# Patient Record
Sex: MALE | Race: WHITE | Employment: FULL TIME | URBAN - METROPOLITAN AREA
[De-identification: names, ages, dates, MRNs, and addresses within clinical notes are randomized per-mention and may not be internally consistent; named-entity substitution may affect disease eponyms.]

---

## 2017-05-25 ENCOUNTER — OFFICE VISIT (OUTPATIENT)
Dept: INTERNAL MEDICINE CLINIC | Age: 42
End: 2017-05-25

## 2017-05-25 VITALS
DIASTOLIC BLOOD PRESSURE: 100 MMHG | SYSTOLIC BLOOD PRESSURE: 160 MMHG | HEIGHT: 71 IN | WEIGHT: 295 LBS | BODY MASS INDEX: 41.3 KG/M2 | HEART RATE: 80 BPM

## 2017-05-25 DIAGNOSIS — N40.1 BENIGN PROSTATIC HYPERPLASIA WITH LOWER URINARY TRACT SYMPTOMS, UNSPECIFIED MORPHOLOGY: ICD-10-CM

## 2017-05-25 DIAGNOSIS — E66.09 NON MORBID OBESITY DUE TO EXCESS CALORIES: ICD-10-CM

## 2017-05-25 DIAGNOSIS — R39.15 URINARY URGENCY: ICD-10-CM

## 2017-05-25 DIAGNOSIS — R35.0 URINARY FREQUENCY: Primary | ICD-10-CM

## 2017-05-25 DIAGNOSIS — Z12.5 SCREENING FOR PROSTATE CANCER: ICD-10-CM

## 2017-05-25 PROCEDURE — 99203 OFFICE O/P NEW LOW 30 MIN: CPT | Performed by: INTERNAL MEDICINE

## 2017-05-25 RX ORDER — TAMSULOSIN HYDROCHLORIDE 0.4 MG/1
0.4 CAPSULE ORAL DAILY
Qty: 30 CAPSULE | Refills: 2 | Status: SHIPPED | OUTPATIENT
Start: 2017-05-25 | End: 2018-11-19

## 2017-05-25 RX ORDER — LISINOPRIL 5 MG/1
5 TABLET ORAL DAILY
Qty: 30 TABLET | Refills: 3 | Status: SHIPPED | OUTPATIENT
Start: 2017-05-25

## 2017-05-25 ASSESSMENT — ENCOUNTER SYMPTOMS
COUGH: 0
EYE DISCHARGE: 0
VOMITING: 0
EYE PAIN: 0
RHINORRHEA: 0
NAUSEA: 0
ABDOMINAL PAIN: 0
WHEEZING: 0
SHORTNESS OF BREATH: 0
COLOR CHANGE: 0
VOICE CHANGE: 0

## 2017-05-25 ASSESSMENT — PATIENT HEALTH QUESTIONNAIRE - PHQ9
2. FEELING DOWN, DEPRESSED OR HOPELESS: 0
1. LITTLE INTEREST OR PLEASURE IN DOING THINGS: 0
SUM OF ALL RESPONSES TO PHQ9 QUESTIONS 1 & 2: 0
SUM OF ALL RESPONSES TO PHQ QUESTIONS 1-9: 0

## 2017-07-29 PROBLEM — R03.0 ELEVATED BLOOD PRESSURE READING: Status: ACTIVE | Noted: 2017-05-25

## 2018-04-17 ENCOUNTER — OFFICE VISIT (OUTPATIENT)
Dept: INTERNAL MEDICINE CLINIC | Age: 43
End: 2018-04-17

## 2018-04-17 VITALS
HEIGHT: 71 IN | DIASTOLIC BLOOD PRESSURE: 92 MMHG | OXYGEN SATURATION: 97 % | HEART RATE: 90 BPM | BODY MASS INDEX: 41.75 KG/M2 | TEMPERATURE: 98.1 F | WEIGHT: 298.2 LBS | SYSTOLIC BLOOD PRESSURE: 130 MMHG

## 2018-04-17 DIAGNOSIS — J00 OTHER ACUTE RHINITIS: ICD-10-CM

## 2018-04-17 DIAGNOSIS — R50.9 FEVER, UNSPECIFIED FEVER CAUSE: ICD-10-CM

## 2018-04-17 DIAGNOSIS — R68.89 FLU-LIKE SYMPTOMS: Primary | ICD-10-CM

## 2018-04-17 LAB
INFLUENZA A ANTIBODY: NORMAL
INFLUENZA B ANTIBODY: NORMAL

## 2018-04-17 PROCEDURE — 99213 OFFICE O/P EST LOW 20 MIN: CPT | Performed by: INTERNAL MEDICINE

## 2018-04-17 PROCEDURE — 87804 INFLUENZA ASSAY W/OPTIC: CPT | Performed by: INTERNAL MEDICINE

## 2018-04-17 RX ORDER — LORATADINE 10 MG/1
10 TABLET ORAL DAILY
Qty: 30 TABLET | Refills: 0 | Status: SHIPPED | OUTPATIENT
Start: 2018-04-17 | End: 2020-05-04

## 2018-04-17 RX ORDER — FLUTICASONE PROPIONATE 50 MCG
1 SPRAY, SUSPENSION (ML) NASAL DAILY
Qty: 1 BOTTLE | Refills: 3 | Status: SHIPPED | OUTPATIENT
Start: 2018-04-17 | End: 2020-05-04

## 2018-04-17 RX ORDER — METHYLPREDNISOLONE 4 MG/1
TABLET ORAL
Qty: 1 KIT | Refills: 0 | Status: SHIPPED | OUTPATIENT
Start: 2018-04-17 | End: 2018-04-23

## 2018-04-17 ASSESSMENT — ENCOUNTER SYMPTOMS
COUGH: 1
DIARRHEA: 0
VOICE CHANGE: 0
WHEEZING: 0
TROUBLE SWALLOWING: 0
PHOTOPHOBIA: 0
VOMITING: 0
SINUS PRESSURE: 1
SHORTNESS OF BREATH: 0
SORE THROAT: 1
SINUS PAIN: 1
RHINORRHEA: 1

## 2018-04-19 ENCOUNTER — TELEPHONE (OUTPATIENT)
Dept: INTERNAL MEDICINE CLINIC | Age: 43
End: 2018-04-19

## 2018-04-19 NOTE — LETTER
[address] [facility]  [phone]         4-  Patient Name: Mark Martin ([id number])  Under the Care of: Priti Ziegler MD      Please excuse Belle Jaramillo from work on 04/20/2018. Will be able to return to work on  next scheduled work day.         Priti Ziegler MD

## 2018-05-02 ENCOUNTER — TELEPHONE (OUTPATIENT)
Dept: INTERNAL MEDICINE CLINIC | Age: 43
End: 2018-05-02

## 2018-10-17 ENCOUNTER — OFFICE VISIT (OUTPATIENT)
Dept: INTERNAL MEDICINE CLINIC | Age: 43
End: 2018-10-17
Payer: COMMERCIAL

## 2018-10-17 VITALS
WEIGHT: 281 LBS | HEIGHT: 71 IN | SYSTOLIC BLOOD PRESSURE: 148 MMHG | BODY MASS INDEX: 39.34 KG/M2 | HEART RATE: 78 BPM | DIASTOLIC BLOOD PRESSURE: 100 MMHG

## 2018-10-17 DIAGNOSIS — F32.A DEPRESSION, UNSPECIFIED DEPRESSION TYPE: Primary | ICD-10-CM

## 2018-10-17 DIAGNOSIS — R03.0 ELEVATED BLOOD PRESSURE READING: ICD-10-CM

## 2018-10-17 DIAGNOSIS — Z13.31 POSITIVE DEPRESSION SCREENING: ICD-10-CM

## 2018-10-17 DIAGNOSIS — F11.24 OPIOID DEPENDENCE WITH OPIOID-INDUCED MOOD DISORDER (HCC): ICD-10-CM

## 2018-10-17 DIAGNOSIS — F41.1 GAD (GENERALIZED ANXIETY DISORDER): ICD-10-CM

## 2018-10-17 DIAGNOSIS — F32.A DEPRESSION, UNSPECIFIED DEPRESSION TYPE: ICD-10-CM

## 2018-10-17 DIAGNOSIS — G89.29 CHRONIC MIDLINE LOW BACK PAIN, WITH SCIATICA PRESENCE UNSPECIFIED: ICD-10-CM

## 2018-10-17 DIAGNOSIS — M54.5 CHRONIC MIDLINE LOW BACK PAIN, WITH SCIATICA PRESENCE UNSPECIFIED: ICD-10-CM

## 2018-10-17 LAB
A/G RATIO: 1.8 (ref 1.1–2.2)
ALBUMIN SERPL-MCNC: 4.9 G/DL (ref 3.4–5)
ALP BLD-CCNC: 77 U/L (ref 40–129)
ALT SERPL-CCNC: 35 U/L (ref 10–40)
ANION GAP SERPL CALCULATED.3IONS-SCNC: 15 MMOL/L (ref 3–16)
AST SERPL-CCNC: 25 U/L (ref 15–37)
BACTERIA: ABNORMAL /HPF
BILIRUB SERPL-MCNC: 0.7 MG/DL (ref 0–1)
BILIRUBIN URINE: ABNORMAL
BLOOD, URINE: ABNORMAL
BUN BLDV-MCNC: 16 MG/DL (ref 7–20)
CALCIUM SERPL-MCNC: 10.6 MG/DL (ref 8.3–10.6)
CHLORIDE BLD-SCNC: 103 MMOL/L (ref 99–110)
CLARITY: ABNORMAL
CO2: 27 MMOL/L (ref 21–32)
COLOR: ABNORMAL
CREAT SERPL-MCNC: 0.8 MG/DL (ref 0.9–1.3)
CRYSTALS, UA: ABNORMAL /HPF
EPITHELIAL CELLS, UA: 1 /HPF (ref 0–5)
FOLATE: 19.07 NG/ML (ref 4.78–24.2)
GFR AFRICAN AMERICAN: >60
GFR NON-AFRICAN AMERICAN: >60
GLOBULIN: 2.8 G/DL
GLUCOSE BLD-MCNC: 107 MG/DL (ref 70–99)
GLUCOSE URINE: NEGATIVE MG/DL
HCT VFR BLD CALC: 53.1 % (ref 40.5–52.5)
HEMOGLOBIN: 17.6 G/DL (ref 13.5–17.5)
HYALINE CASTS: 3 /LPF (ref 0–8)
KETONES, URINE: ABNORMAL MG/DL
LEUKOCYTE ESTERASE, URINE: ABNORMAL
MCH RBC QN AUTO: 29.9 PG (ref 26–34)
MCHC RBC AUTO-ENTMCNC: 33.2 G/DL (ref 31–36)
MCV RBC AUTO: 90.2 FL (ref 80–100)
MICROSCOPIC EXAMINATION: YES
NITRITE, URINE: NEGATIVE
PDW BLD-RTO: 13.4 % (ref 12.4–15.4)
PH UA: 6
PLATELET # BLD: 264 K/UL (ref 135–450)
PMV BLD AUTO: 10.6 FL (ref 5–10.5)
POTASSIUM SERPL-SCNC: 3.9 MMOL/L (ref 3.5–5.1)
PROTEIN UA: 30 MG/DL
RBC # BLD: 5.89 M/UL (ref 4.2–5.9)
RBC UA: 5 /HPF (ref 0–4)
SODIUM BLD-SCNC: 145 MMOL/L (ref 136–145)
SPECIFIC GRAVITY UA: >1.03
TOTAL PROTEIN: 7.7 G/DL (ref 6.4–8.2)
TSH REFLEX: 4.08 UIU/ML (ref 0.27–4.2)
URINE TYPE: ABNORMAL
UROBILINOGEN, URINE: 0.2 E.U./DL
VITAMIN B-12: 933 PG/ML (ref 211–911)
WBC # BLD: 13.1 K/UL (ref 4–11)
WBC UA: 6 /HPF (ref 0–5)

## 2018-10-17 PROCEDURE — 99214 OFFICE O/P EST MOD 30 MIN: CPT | Performed by: INTERNAL MEDICINE

## 2018-10-17 PROCEDURE — G8431 POS CLIN DEPRES SCRN F/U DOC: HCPCS | Performed by: INTERNAL MEDICINE

## 2018-10-17 PROCEDURE — G0444 DEPRESSION SCREEN ANNUAL: HCPCS | Performed by: INTERNAL MEDICINE

## 2018-10-17 RX ORDER — CLONIDINE HYDROCHLORIDE 0.1 MG/1
0.1 TABLET ORAL NIGHTLY
Qty: 30 TABLET | Refills: 2 | Status: SHIPPED | OUTPATIENT
Start: 2018-10-17 | End: 2019-01-14 | Stop reason: SDUPTHER

## 2018-10-17 ASSESSMENT — PATIENT HEALTH QUESTIONNAIRE - PHQ9
SUM OF ALL RESPONSES TO PHQ QUESTIONS 1-9: 19
5. POOR APPETITE OR OVEREATING: 3
1. LITTLE INTEREST OR PLEASURE IN DOING THINGS: 2
10. IF YOU CHECKED OFF ANY PROBLEMS, HOW DIFFICULT HAVE THESE PROBLEMS MADE IT FOR YOU TO DO YOUR WORK, TAKE CARE OF THINGS AT HOME, OR GET ALONG WITH OTHER PEOPLE: 2
8. MOVING OR SPEAKING SO SLOWLY THAT OTHER PEOPLE COULD HAVE NOTICED. OR THE OPPOSITE, BEING SO FIGETY OR RESTLESS THAT YOU HAVE BEEN MOVING AROUND A LOT MORE THAN USUAL: 3
3. TROUBLE FALLING OR STAYING ASLEEP: 3
4. FEELING TIRED OR HAVING LITTLE ENERGY: 3
7. TROUBLE CONCENTRATING ON THINGS, SUCH AS READING THE NEWSPAPER OR WATCHING TELEVISION: 0
2. FEELING DOWN, DEPRESSED OR HOPELESS: 2
9. THOUGHTS THAT YOU WOULD BE BETTER OFF DEAD, OR OF HURTING YOURSELF: 0
6. FEELING BAD ABOUT YOURSELF - OR THAT YOU ARE A FAILURE OR HAVE LET YOURSELF OR YOUR FAMILY DOWN: 3
SUM OF ALL RESPONSES TO PHQ9 QUESTIONS 1 & 2: 4
SUM OF ALL RESPONSES TO PHQ QUESTIONS 1-9: 19

## 2018-10-17 ASSESSMENT — ENCOUNTER SYMPTOMS
ABDOMINAL PAIN: 0
VOICE CHANGE: 0
WHEEZING: 0
VOMITING: 0
SHORTNESS OF BREATH: 0
TROUBLE SWALLOWING: 0
NAUSEA: 0

## 2018-10-17 NOTE — PROGRESS NOTES
Endocrine: Negative for polyphagia and polyuria. Genitourinary: Negative for difficulty urinating, dysuria and frequency. Musculoskeletal:        Chronic back pain   Psychiatric/Behavioral: Positive for decreased concentration and sleep disturbance. Negative for hallucinations and suicidal ideas. The patient is nervous/anxious. OBJECTIVE:  Pulse Readings from Last 4 Encounters:   10/17/18 78   04/17/18 90   05/25/17 80   10/08/15 69     Wt Readings from Last 4 Encounters:   10/17/18 281 lb (127.5 kg)   04/17/18 298 lb 3.2 oz (135.3 kg)   05/25/17 295 lb (133.8 kg)   10/08/15 295 lb 4.8 oz (133.9 kg)     BP Readings from Last 4 Encounters:   10/17/18 (!) 148/100   04/17/18 (!) 130/92   05/25/17 (!) 160/100   10/08/15 138/89     Physical Exam   Constitutional: He is oriented to person, place, and time. No distress. obese   HENT:   Head: Normocephalic and atraumatic. Eyes: Pupils are equal, round, and reactive to light. Conjunctivae and EOM are normal.   Neck: Normal range of motion. Neck supple. No thyromegaly present. Cardiovascular: Normal rate, regular rhythm and normal heart sounds. Pulmonary/Chest: Effort normal and breath sounds normal.   Abdominal: Soft. Bowel sounds are normal.   obese   Musculoskeletal:   Mild tenderness at the lower spine and paraspinal area   Neurological: He is alert and oriented to person, place, and time. He displays normal reflexes. He exhibits normal muscle tone. Coordination normal.   Skin: Skin is warm and dry. Capillary refill takes less than 2 seconds. He is not diaphoretic. Psychiatric:   PHQ 9 score is 17  LAMONT score is 18   Nursing note and vitals reviewed.       CBC:   Lab Results   Component Value Date    WBC 9.3 10/08/2015    HGB 15.3 10/08/2015    HCT 45.9 10/08/2015     10/08/2015     CMP:  Lab Results   Component Value Date     10/07/2015    K 3.6 10/07/2015     10/07/2015    CO2 25 10/07/2015    ANIONGAP 16 10/07/2015    GLUCOSE software. Every effort was made to ensure accuracy; however, inadvertent  Unintentional computerized transcription errors may be present. On the basis of positive PHQ-9 screening (PHQ-9 Total Score: 19), the following plan was implemented: medication prescribed: Zoloft- 50 mg/day- patient will call for any significant medication side effects or worsening symptoms of depression. Patient will follow-up in 1 month(s) with PCP. He is referred to  Psychiatrist and will follow-up with psychiatrist at his earliest convenience.

## 2018-10-18 DIAGNOSIS — R89.9 ABNORMAL LABORATORY TEST: Primary | ICD-10-CM

## 2018-10-18 PROBLEM — R82.998 CALCIUM OXALATE CRYSTALS IN URINE: Status: ACTIVE | Noted: 2018-10-18

## 2018-10-18 LAB
ESTIMATED AVERAGE GLUCOSE: 108.3 MG/DL
HBA1C MFR BLD: 5.4 %

## 2018-10-18 RX ORDER — CEPHALEXIN 500 MG/1
500 CAPSULE ORAL 3 TIMES DAILY
Qty: 21 CAPSULE | Refills: 0 | Status: SHIPPED | OUTPATIENT
Start: 2018-10-18 | End: 2018-11-19 | Stop reason: ALTCHOICE

## 2018-10-23 ENCOUNTER — TELEPHONE (OUTPATIENT)
Dept: INTERNAL MEDICINE CLINIC | Age: 43
End: 2018-10-23

## 2018-10-23 NOTE — TELEPHONE ENCOUNTER
Pt wife calling ---pt  has gotten 2 calls from someone named Alfredo Baron about moving pt appt up----she keeps giving are number to call but there is no message about where or why to move his appt---who is she and where would we reach her? Do you need this pt moved? If so please send me message back as to where you want him moved and I will call the wife back and take care of it. Thanks.

## 2018-11-19 ENCOUNTER — OFFICE VISIT (OUTPATIENT)
Dept: INTERNAL MEDICINE CLINIC | Age: 43
End: 2018-11-19
Payer: COMMERCIAL

## 2018-11-19 VITALS
HEART RATE: 72 BPM | BODY MASS INDEX: 39.2 KG/M2 | WEIGHT: 280 LBS | DIASTOLIC BLOOD PRESSURE: 88 MMHG | SYSTOLIC BLOOD PRESSURE: 134 MMHG | HEIGHT: 71 IN

## 2018-11-19 DIAGNOSIS — D72.829 LEUKOCYTOSIS, UNSPECIFIED TYPE: ICD-10-CM

## 2018-11-19 DIAGNOSIS — F41.1 GAD (GENERALIZED ANXIETY DISORDER): ICD-10-CM

## 2018-11-19 DIAGNOSIS — F51.01 PRIMARY INSOMNIA: ICD-10-CM

## 2018-11-19 DIAGNOSIS — I10 ESSENTIAL HYPERTENSION: ICD-10-CM

## 2018-11-19 DIAGNOSIS — F32.A DEPRESSION, UNSPECIFIED DEPRESSION TYPE: Primary | ICD-10-CM

## 2018-11-19 PROCEDURE — 99214 OFFICE O/P EST MOD 30 MIN: CPT | Performed by: INTERNAL MEDICINE

## 2018-11-19 ASSESSMENT — ENCOUNTER SYMPTOMS
NAUSEA: 0
PHOTOPHOBIA: 0
ABDOMINAL PAIN: 0
WHEEZING: 0
SHORTNESS OF BREATH: 0

## 2018-12-03 DIAGNOSIS — F32.A DEPRESSION, UNSPECIFIED DEPRESSION TYPE: ICD-10-CM

## 2019-03-06 ENCOUNTER — HOSPITAL ENCOUNTER (EMERGENCY)
Age: 44
Discharge: HOME | End: 2019-03-06
Payer: COMMERCIAL

## 2019-03-06 ENCOUNTER — APPOINTMENT (OUTPATIENT)
Dept: CT IMAGING | Age: 44
End: 2019-03-06
Payer: COMMERCIAL

## 2019-03-06 ENCOUNTER — APPOINTMENT (OUTPATIENT)
Dept: GENERAL RADIOLOGY | Age: 44
End: 2019-03-06
Payer: COMMERCIAL

## 2019-03-06 VITALS
WEIGHT: 285 LBS | SYSTOLIC BLOOD PRESSURE: 140 MMHG | RESPIRATION RATE: 16 BRPM | HEART RATE: 74 BPM | BODY MASS INDEX: 39.9 KG/M2 | DIASTOLIC BLOOD PRESSURE: 86 MMHG | OXYGEN SATURATION: 95 % | TEMPERATURE: 98.2 F | HEIGHT: 71 IN

## 2019-03-06 DIAGNOSIS — N20.1 URETEROLITHIASIS: Primary | ICD-10-CM

## 2019-03-06 DIAGNOSIS — R10.9 LEFT FLANK PAIN: ICD-10-CM

## 2019-03-06 LAB
A/G RATIO: 1.6 (ref 1.1–2.2)
ALBUMIN SERPL-MCNC: 4.4 G/DL (ref 3.4–5)
ALP BLD-CCNC: 72 U/L (ref 40–129)
ALT SERPL-CCNC: 34 U/L (ref 10–40)
ANION GAP SERPL CALCULATED.3IONS-SCNC: 14 MMOL/L (ref 3–16)
AST SERPL-CCNC: 28 U/L (ref 15–37)
BASOPHILS ABSOLUTE: 0.1 K/UL (ref 0–0.2)
BASOPHILS RELATIVE PERCENT: 0.8 %
BILIRUB SERPL-MCNC: 0.5 MG/DL (ref 0–1)
BILIRUBIN URINE: ABNORMAL
BLOOD, URINE: ABNORMAL
BUN BLDV-MCNC: 13 MG/DL (ref 7–20)
CALCIUM SERPL-MCNC: 9.3 MG/DL (ref 8.3–10.6)
CHLORIDE BLD-SCNC: 104 MMOL/L (ref 99–110)
CLARITY: ABNORMAL
CO2: 23 MMOL/L (ref 21–32)
COLOR: ABNORMAL
CREAT SERPL-MCNC: 0.7 MG/DL (ref 0.9–1.3)
EOSINOPHILS ABSOLUTE: 0 K/UL (ref 0–0.6)
EOSINOPHILS RELATIVE PERCENT: 0.6 %
EPITHELIAL CELLS, UA: 1 /HPF (ref 0–5)
GFR AFRICAN AMERICAN: >60
GFR NON-AFRICAN AMERICAN: >60
GLOBULIN: 2.7 G/DL
GLUCOSE BLD-MCNC: 99 MG/DL (ref 70–99)
GLUCOSE URINE: NEGATIVE MG/DL
HCT VFR BLD CALC: 45.8 % (ref 40.5–52.5)
HEMOGLOBIN: 15.6 G/DL (ref 13.5–17.5)
HYALINE CASTS: 1 /LPF (ref 0–8)
KETONES, URINE: ABNORMAL MG/DL
LEUKOCYTE ESTERASE, URINE: ABNORMAL
LIPASE: 38 U/L (ref 13–60)
LYMPHOCYTES ABSOLUTE: 1.3 K/UL (ref 1–5.1)
LYMPHOCYTES RELATIVE PERCENT: 17.1 %
MCH RBC QN AUTO: 30.1 PG (ref 26–34)
MCHC RBC AUTO-ENTMCNC: 34 G/DL (ref 31–36)
MCV RBC AUTO: 88.6 FL (ref 80–100)
MICROSCOPIC EXAMINATION: YES
MONOCYTES ABSOLUTE: 0.9 K/UL (ref 0–1.3)
MONOCYTES RELATIVE PERCENT: 12.7 %
NEUTROPHILS ABSOLUTE: 5.1 K/UL (ref 1.7–7.7)
NEUTROPHILS RELATIVE PERCENT: 68.8 %
NITRITE, URINE: NEGATIVE
PDW BLD-RTO: 13.8 % (ref 12.4–15.4)
PH UA: 6 (ref 5–8)
PLATELET # BLD: 166 K/UL (ref 135–450)
PMV BLD AUTO: 9.5 FL (ref 5–10.5)
POTASSIUM REFLEX MAGNESIUM: 4 MMOL/L (ref 3.5–5.1)
PROTEIN UA: 30 MG/DL
RBC # BLD: 5.17 M/UL (ref 4.2–5.9)
RBC UA: >900 /HPF (ref 0–4)
SODIUM BLD-SCNC: 141 MMOL/L (ref 136–145)
SPECIFIC GRAVITY UA: 1.02 (ref 1–1.03)
TOTAL PROTEIN: 7.1 G/DL (ref 6.4–8.2)
URINE REFLEX TO CULTURE: YES
URINE TYPE: ABNORMAL
UROBILINOGEN, URINE: 1 E.U./DL
WBC # BLD: 7.4 K/UL (ref 4–11)
WBC UA: 5 /HPF (ref 0–5)

## 2019-03-06 PROCEDURE — 85025 COMPLETE CBC W/AUTO DIFF WBC: CPT

## 2019-03-06 PROCEDURE — 96375 TX/PRO/DX INJ NEW DRUG ADDON: CPT

## 2019-03-06 PROCEDURE — 87086 URINE CULTURE/COLONY COUNT: CPT

## 2019-03-06 PROCEDURE — 74176 CT ABD & PELVIS W/O CONTRAST: CPT

## 2019-03-06 PROCEDURE — 96361 HYDRATE IV INFUSION ADD-ON: CPT

## 2019-03-06 PROCEDURE — 6360000002 HC RX W HCPCS: Performed by: PHYSICIAN ASSISTANT

## 2019-03-06 PROCEDURE — 6370000000 HC RX 637 (ALT 250 FOR IP): Performed by: PHYSICIAN ASSISTANT

## 2019-03-06 PROCEDURE — 81001 URINALYSIS AUTO W/SCOPE: CPT

## 2019-03-06 PROCEDURE — 2580000003 HC RX 258: Performed by: PHYSICIAN ASSISTANT

## 2019-03-06 PROCEDURE — 96376 TX/PRO/DX INJ SAME DRUG ADON: CPT

## 2019-03-06 PROCEDURE — 71046 X-RAY EXAM CHEST 2 VIEWS: CPT

## 2019-03-06 PROCEDURE — 83690 ASSAY OF LIPASE: CPT

## 2019-03-06 PROCEDURE — 96374 THER/PROPH/DIAG INJ IV PUSH: CPT

## 2019-03-06 PROCEDURE — 80053 COMPREHEN METABOLIC PANEL: CPT

## 2019-03-06 PROCEDURE — 99284 EMERGENCY DEPT VISIT MOD MDM: CPT

## 2019-03-06 RX ORDER — TAMSULOSIN HYDROCHLORIDE 0.4 MG/1
0.4 CAPSULE ORAL DAILY
Qty: 5 CAPSULE | Refills: 0 | Status: SHIPPED | OUTPATIENT
Start: 2019-03-06 | End: 2020-05-04

## 2019-03-06 RX ORDER — HYDROCODONE BITARTRATE AND ACETAMINOPHEN 5; 325 MG/1; MG/1
1 TABLET ORAL EVERY 6 HOURS PRN
Qty: 20 TABLET | Refills: 0 | Status: SHIPPED | OUTPATIENT
Start: 2019-03-06 | End: 2019-03-09

## 2019-03-06 RX ORDER — ONDANSETRON 4 MG/1
4 TABLET, ORALLY DISINTEGRATING ORAL EVERY 8 HOURS PRN
Qty: 20 TABLET | Refills: 0 | Status: SHIPPED | OUTPATIENT
Start: 2019-03-06 | End: 2020-05-04

## 2019-03-06 RX ORDER — MORPHINE SULFATE 4 MG/ML
4 INJECTION, SOLUTION INTRAMUSCULAR; INTRAVENOUS
Status: DISCONTINUED | OUTPATIENT
Start: 2019-03-06 | End: 2019-03-06 | Stop reason: HOSPADM

## 2019-03-06 RX ORDER — TAMSULOSIN HYDROCHLORIDE 0.4 MG/1
0.4 CAPSULE ORAL DAILY
Status: DISCONTINUED | OUTPATIENT
Start: 2019-03-06 | End: 2019-03-06 | Stop reason: HOSPADM

## 2019-03-06 RX ORDER — KETOROLAC TROMETHAMINE 30 MG/ML
30 INJECTION, SOLUTION INTRAMUSCULAR; INTRAVENOUS ONCE
Status: COMPLETED | OUTPATIENT
Start: 2019-03-06 | End: 2019-03-06

## 2019-03-06 RX ORDER — 0.9 % SODIUM CHLORIDE 0.9 %
1000 INTRAVENOUS SOLUTION INTRAVENOUS ONCE
Status: COMPLETED | OUTPATIENT
Start: 2019-03-06 | End: 2019-03-06

## 2019-03-06 RX ORDER — ONDANSETRON 2 MG/ML
4 INJECTION INTRAMUSCULAR; INTRAVENOUS
Status: COMPLETED | OUTPATIENT
Start: 2019-03-06 | End: 2019-03-06

## 2019-03-06 RX ADMIN — KETOROLAC TROMETHAMINE 30 MG: 30 INJECTION, SOLUTION INTRAMUSCULAR at 16:42

## 2019-03-06 RX ADMIN — SODIUM CHLORIDE 1000 ML: 9 INJECTION, SOLUTION INTRAVENOUS at 15:24

## 2019-03-06 RX ADMIN — MORPHINE SULFATE 4 MG: 4 INJECTION INTRAVENOUS at 15:25

## 2019-03-06 RX ADMIN — ONDANSETRON 4 MG: 2 INJECTION INTRAMUSCULAR; INTRAVENOUS at 15:25

## 2019-03-06 RX ADMIN — TAMSULOSIN HYDROCHLORIDE 0.4 MG: 0.4 CAPSULE ORAL at 17:12

## 2019-03-06 RX ADMIN — MORPHINE SULFATE 4 MG: 4 INJECTION INTRAVENOUS at 16:42

## 2019-03-06 RX ADMIN — ONDANSETRON 4 MG: 2 INJECTION INTRAMUSCULAR; INTRAVENOUS at 16:41

## 2019-03-06 ASSESSMENT — ENCOUNTER SYMPTOMS
BACK PAIN: 1
COUGH: 1
VOMITING: 0
COLOR CHANGE: 0
SHORTNESS OF BREATH: 0
ABDOMINAL PAIN: 1
DIARRHEA: 0
NAUSEA: 1

## 2019-03-06 ASSESSMENT — PAIN SCALES - GENERAL
PAINLEVEL_OUTOF10: 9

## 2019-03-06 ASSESSMENT — PAIN DESCRIPTION - PAIN TYPE: TYPE: ACUTE PAIN

## 2019-03-06 ASSESSMENT — PAIN DESCRIPTION - LOCATION: LOCATION: ABDOMEN

## 2019-03-08 LAB — URINE CULTURE, ROUTINE: NORMAL

## 2019-03-12 ENCOUNTER — HOSPITAL ENCOUNTER (EMERGENCY)
Age: 44
Discharge: HOME | End: 2019-03-13
Attending: EMERGENCY MEDICINE
Payer: COMMERCIAL

## 2019-03-12 DIAGNOSIS — N20.1 URETEROLITHIASIS: Primary | ICD-10-CM

## 2019-03-12 LAB
A/G RATIO: 1.5 (ref 1.1–2.2)
ALBUMIN SERPL-MCNC: 4.3 G/DL (ref 3.4–5)
ALP BLD-CCNC: 66 U/L (ref 40–129)
ALT SERPL-CCNC: 26 U/L (ref 10–40)
ANION GAP SERPL CALCULATED.3IONS-SCNC: 13 MMOL/L (ref 3–16)
AST SERPL-CCNC: 24 U/L (ref 15–37)
BASOPHILS ABSOLUTE: 0.1 K/UL (ref 0–0.2)
BASOPHILS RELATIVE PERCENT: 0.6 %
BILIRUB SERPL-MCNC: 0.5 MG/DL (ref 0–1)
BILIRUBIN URINE: NEGATIVE
BLOOD, URINE: ABNORMAL
BUN BLDV-MCNC: 27 MG/DL (ref 7–20)
CALCIUM SERPL-MCNC: 9.3 MG/DL (ref 8.3–10.6)
CHLORIDE BLD-SCNC: 101 MMOL/L (ref 99–110)
CLARITY: CLEAR
CO2: 26 MMOL/L (ref 21–32)
COLOR: YELLOW
CREAT SERPL-MCNC: 1.3 MG/DL (ref 0.9–1.3)
EOSINOPHILS ABSOLUTE: 0.1 K/UL (ref 0–0.6)
EOSINOPHILS RELATIVE PERCENT: 0.5 %
EPITHELIAL CELLS, UA: 1 /HPF (ref 0–5)
GFR AFRICAN AMERICAN: >60
GFR NON-AFRICAN AMERICAN: 60
GLOBULIN: 2.9 G/DL
GLUCOSE BLD-MCNC: 100 MG/DL (ref 70–99)
GLUCOSE URINE: NEGATIVE MG/DL
HCT VFR BLD CALC: 45.2 % (ref 40.5–52.5)
HEMOGLOBIN: 15.2 G/DL (ref 13.5–17.5)
HYALINE CASTS: 0 /LPF (ref 0–8)
KETONES, URINE: NEGATIVE MG/DL
LEUKOCYTE ESTERASE, URINE: NEGATIVE
LYMPHOCYTES ABSOLUTE: 2.6 K/UL (ref 1–5.1)
LYMPHOCYTES RELATIVE PERCENT: 24.4 %
MCH RBC QN AUTO: 29.6 PG (ref 26–34)
MCHC RBC AUTO-ENTMCNC: 33.7 G/DL (ref 31–36)
MCV RBC AUTO: 87.7 FL (ref 80–100)
MICROSCOPIC EXAMINATION: YES
MONOCYTES ABSOLUTE: 1.2 K/UL (ref 0–1.3)
MONOCYTES RELATIVE PERCENT: 11.2 %
NEUTROPHILS ABSOLUTE: 6.8 K/UL (ref 1.7–7.7)
NEUTROPHILS RELATIVE PERCENT: 63.3 %
NITRITE, URINE: NEGATIVE
PDW BLD-RTO: 13.1 % (ref 12.4–15.4)
PH UA: 5.5 (ref 5–8)
PLATELET # BLD: 214 K/UL (ref 135–450)
PMV BLD AUTO: 8.7 FL (ref 5–10.5)
POTASSIUM SERPL-SCNC: 4.1 MMOL/L (ref 3.5–5.1)
PROTEIN UA: NEGATIVE MG/DL
RBC # BLD: 5.16 M/UL (ref 4.2–5.9)
RBC UA: 1 /HPF (ref 0–4)
SODIUM BLD-SCNC: 140 MMOL/L (ref 136–145)
SPECIFIC GRAVITY UA: 1.03 (ref 1–1.03)
TOTAL PROTEIN: 7.2 G/DL (ref 6.4–8.2)
URINE TYPE: ABNORMAL
UROBILINOGEN, URINE: 0.2 E.U./DL
WBC # BLD: 10.7 K/UL (ref 4–11)
WBC UA: 4 /HPF (ref 0–5)

## 2019-03-12 PROCEDURE — 36415 COLL VENOUS BLD VENIPUNCTURE: CPT

## 2019-03-12 PROCEDURE — 99284 EMERGENCY DEPT VISIT MOD MDM: CPT

## 2019-03-12 PROCEDURE — 85025 COMPLETE CBC W/AUTO DIFF WBC: CPT

## 2019-03-12 PROCEDURE — 81001 URINALYSIS AUTO W/SCOPE: CPT

## 2019-03-12 PROCEDURE — 80053 COMPREHEN METABOLIC PANEL: CPT

## 2019-03-12 RX ORDER — KETOROLAC TROMETHAMINE 30 MG/ML
30 INJECTION, SOLUTION INTRAMUSCULAR; INTRAVENOUS ONCE
Status: COMPLETED | OUTPATIENT
Start: 2019-03-13 | End: 2019-03-13

## 2019-03-12 RX ORDER — ONDANSETRON 2 MG/ML
4 INJECTION INTRAMUSCULAR; INTRAVENOUS ONCE
Status: COMPLETED | OUTPATIENT
Start: 2019-03-13 | End: 2019-03-13

## 2019-03-12 RX ORDER — 0.9 % SODIUM CHLORIDE 0.9 %
1000 INTRAVENOUS SOLUTION INTRAVENOUS ONCE
Status: COMPLETED | OUTPATIENT
Start: 2019-03-13 | End: 2019-03-13

## 2019-03-12 ASSESSMENT — PAIN SCALES - GENERAL: PAINLEVEL_OUTOF10: 9

## 2019-03-12 ASSESSMENT — PAIN DESCRIPTION - PAIN TYPE: TYPE: ACUTE PAIN

## 2019-03-12 ASSESSMENT — PAIN DESCRIPTION - LOCATION: LOCATION: FLANK

## 2019-03-13 ENCOUNTER — APPOINTMENT (OUTPATIENT)
Dept: GENERAL RADIOLOGY | Age: 44
End: 2019-03-13
Payer: COMMERCIAL

## 2019-03-13 VITALS
SYSTOLIC BLOOD PRESSURE: 134 MMHG | OXYGEN SATURATION: 95 % | HEIGHT: 71 IN | DIASTOLIC BLOOD PRESSURE: 86 MMHG | HEART RATE: 70 BPM | TEMPERATURE: 98.1 F | BODY MASS INDEX: 40.6 KG/M2 | WEIGHT: 290 LBS | RESPIRATION RATE: 16 BRPM

## 2019-03-13 PROCEDURE — 6360000002 HC RX W HCPCS: Performed by: EMERGENCY MEDICINE

## 2019-03-13 PROCEDURE — 96374 THER/PROPH/DIAG INJ IV PUSH: CPT

## 2019-03-13 PROCEDURE — 96361 HYDRATE IV INFUSION ADD-ON: CPT

## 2019-03-13 PROCEDURE — 74019 RADEX ABDOMEN 2 VIEWS: CPT

## 2019-03-13 PROCEDURE — 2580000003 HC RX 258: Performed by: EMERGENCY MEDICINE

## 2019-03-13 PROCEDURE — 96375 TX/PRO/DX INJ NEW DRUG ADDON: CPT

## 2019-03-13 RX ORDER — OXYCODONE HYDROCHLORIDE AND ACETAMINOPHEN 5; 325 MG/1; MG/1
1 TABLET ORAL EVERY 4 HOURS PRN
Qty: 15 TABLET | Refills: 0 | Status: SHIPPED | OUTPATIENT
Start: 2019-03-13 | End: 2019-03-16

## 2019-03-13 RX ADMIN — KETOROLAC TROMETHAMINE 30 MG: 30 INJECTION, SOLUTION INTRAMUSCULAR at 00:15

## 2019-03-13 RX ADMIN — ONDANSETRON 4 MG: 2 INJECTION INTRAMUSCULAR; INTRAVENOUS at 00:13

## 2019-03-13 RX ADMIN — SODIUM CHLORIDE 1000 ML: 9 INJECTION, SOLUTION INTRAVENOUS at 00:13

## 2019-03-19 ENCOUNTER — HOSPITAL ENCOUNTER (OUTPATIENT)
Dept: GENERAL RADIOLOGY | Age: 44
Discharge: HOME | End: 2019-03-19
Payer: COMMERCIAL

## 2019-03-19 ENCOUNTER — HOSPITAL ENCOUNTER (OUTPATIENT)
Age: 44
Discharge: HOME | End: 2019-03-19
Payer: COMMERCIAL

## 2019-03-19 DIAGNOSIS — N20.0 CALCULUS OF KIDNEY: ICD-10-CM

## 2019-03-19 PROCEDURE — 74018 RADEX ABDOMEN 1 VIEW: CPT

## 2019-04-09 ENCOUNTER — HOSPITAL ENCOUNTER (OUTPATIENT)
Dept: CT IMAGING | Age: 44
Discharge: HOME | End: 2019-04-09
Payer: COMMERCIAL

## 2019-04-09 DIAGNOSIS — N20.0 KIDNEY STONE: ICD-10-CM

## 2019-04-09 PROCEDURE — 6360000004 HC RX CONTRAST MEDICATION: Performed by: UROLOGY

## 2019-04-09 PROCEDURE — 74178 CT ABD&PLV WO CNTR FLWD CNTR: CPT

## 2019-04-09 RX ADMIN — IOPAMIDOL 120 ML: 755 INJECTION, SOLUTION INTRAVENOUS at 15:02

## 2019-06-13 DIAGNOSIS — F32.A DEPRESSION, UNSPECIFIED DEPRESSION TYPE: ICD-10-CM

## 2019-11-15 ENCOUNTER — HOSPITAL ENCOUNTER (EMERGENCY)
Age: 44
Discharge: LEFT BEFORE DISCHARGE | End: 2019-11-15
Attending: EMERGENCY MEDICINE
Payer: COMMERCIAL

## 2019-11-15 ENCOUNTER — APPOINTMENT (OUTPATIENT)
Dept: GENERAL RADIOLOGY | Age: 44
End: 2019-11-15
Payer: COMMERCIAL

## 2019-11-15 VITALS
RESPIRATION RATE: 18 BRPM | HEIGHT: 71 IN | HEART RATE: 91 BPM | WEIGHT: 315 LBS | SYSTOLIC BLOOD PRESSURE: 183 MMHG | DIASTOLIC BLOOD PRESSURE: 101 MMHG | BODY MASS INDEX: 44.1 KG/M2 | TEMPERATURE: 99 F | OXYGEN SATURATION: 100 %

## 2019-11-15 LAB
A/G RATIO: 1.6 (ref 1.1–2.2)
ALBUMIN SERPL-MCNC: 4.7 G/DL (ref 3.4–5)
ALP BLD-CCNC: 63 U/L (ref 40–129)
ALT SERPL-CCNC: 54 U/L (ref 10–40)
ANION GAP SERPL CALCULATED.3IONS-SCNC: 12 MMOL/L (ref 3–16)
AST SERPL-CCNC: 40 U/L (ref 15–37)
BASOPHILS ABSOLUTE: 0.1 K/UL (ref 0–0.2)
BASOPHILS RELATIVE PERCENT: 0.6 %
BILIRUB SERPL-MCNC: 0.8 MG/DL (ref 0–1)
BUN BLDV-MCNC: 15 MG/DL (ref 7–20)
CALCIUM SERPL-MCNC: 9.5 MG/DL (ref 8.3–10.6)
CHLORIDE BLD-SCNC: 103 MMOL/L (ref 99–110)
CO2: 25 MMOL/L (ref 21–32)
CREAT SERPL-MCNC: 0.8 MG/DL (ref 0.9–1.3)
EOSINOPHILS ABSOLUTE: 0 K/UL (ref 0–0.6)
EOSINOPHILS RELATIVE PERCENT: 0.1 %
GFR AFRICAN AMERICAN: >60
GFR NON-AFRICAN AMERICAN: >60
GLOBULIN: 2.9 G/DL
GLUCOSE BLD-MCNC: 111 MG/DL (ref 70–99)
HCT VFR BLD CALC: 48.5 % (ref 40.5–52.5)
HEMOGLOBIN: 16 G/DL (ref 13.5–17.5)
LIPASE: 19 U/L (ref 13–60)
LYMPHOCYTES ABSOLUTE: 1.7 K/UL (ref 1–5.1)
LYMPHOCYTES RELATIVE PERCENT: 14.9 %
MCH RBC QN AUTO: 29.6 PG (ref 26–34)
MCHC RBC AUTO-ENTMCNC: 33.1 G/DL (ref 31–36)
MCV RBC AUTO: 89.7 FL (ref 80–100)
MONOCYTES ABSOLUTE: 0.8 K/UL (ref 0–1.3)
MONOCYTES RELATIVE PERCENT: 7 %
NEUTROPHILS ABSOLUTE: 8.9 K/UL (ref 1.7–7.7)
NEUTROPHILS RELATIVE PERCENT: 77.4 %
PDW BLD-RTO: 13.1 % (ref 12.4–15.4)
PLATELET # BLD: 200 K/UL (ref 135–450)
PMV BLD AUTO: 9.3 FL (ref 5–10.5)
POTASSIUM REFLEX MAGNESIUM: 3.6 MMOL/L (ref 3.5–5.1)
PRO-BNP: 33 PG/ML (ref 0–124)
RBC # BLD: 5.41 M/UL (ref 4.2–5.9)
SODIUM BLD-SCNC: 140 MMOL/L (ref 136–145)
TOTAL PROTEIN: 7.6 G/DL (ref 6.4–8.2)
TROPONIN: <0.01 NG/ML
WBC # BLD: 11.5 K/UL (ref 4–11)

## 2019-11-15 PROCEDURE — 85025 COMPLETE CBC W/AUTO DIFF WBC: CPT

## 2019-11-15 PROCEDURE — 99285 EMERGENCY DEPT VISIT HI MDM: CPT

## 2019-11-15 PROCEDURE — 71046 X-RAY EXAM CHEST 2 VIEWS: CPT

## 2019-11-15 PROCEDURE — 83690 ASSAY OF LIPASE: CPT

## 2019-11-15 PROCEDURE — 80053 COMPREHEN METABOLIC PANEL: CPT

## 2019-11-15 PROCEDURE — 84484 ASSAY OF TROPONIN QUANT: CPT

## 2019-11-15 PROCEDURE — 93005 ELECTROCARDIOGRAM TRACING: CPT | Performed by: EMERGENCY MEDICINE

## 2019-11-15 PROCEDURE — 83880 ASSAY OF NATRIURETIC PEPTIDE: CPT

## 2019-11-15 RX ORDER — CLONIDINE HYDROCHLORIDE 0.1 MG/1
0.1 TABLET ORAL ONCE
Status: DISCONTINUED | OUTPATIENT
Start: 2019-11-15 | End: 2019-11-15 | Stop reason: HOSPADM

## 2019-11-15 ASSESSMENT — PAIN DESCRIPTION - DESCRIPTORS: DESCRIPTORS: DULL;HEAVINESS

## 2019-11-15 ASSESSMENT — PAIN DESCRIPTION - LOCATION: LOCATION: CHEST

## 2019-11-15 ASSESSMENT — PAIN SCALES - GENERAL: PAINLEVEL_OUTOF10: 3

## 2019-11-15 ASSESSMENT — PAIN DESCRIPTION - PAIN TYPE: TYPE: ACUTE PAIN

## 2019-11-16 LAB
EKG ATRIAL RATE: 89 BPM
EKG DIAGNOSIS: NORMAL
EKG P AXIS: 51 DEGREES
EKG P-R INTERVAL: 138 MS
EKG Q-T INTERVAL: 378 MS
EKG QRS DURATION: 108 MS
EKG QTC CALCULATION (BAZETT): 459 MS
EKG R AXIS: 14 DEGREES
EKG T AXIS: 24 DEGREES
EKG VENTRICULAR RATE: 89 BPM

## 2019-11-16 PROCEDURE — 93010 ELECTROCARDIOGRAM REPORT: CPT | Performed by: INTERNAL MEDICINE

## 2019-11-18 ENCOUNTER — TELEPHONE (OUTPATIENT)
Dept: INTERNAL MEDICINE CLINIC | Age: 44
End: 2019-11-18

## 2020-04-15 ENCOUNTER — OFFICE VISIT (OUTPATIENT)
Dept: ORTHOPEDIC SURGERY | Age: 45
End: 2020-04-15
Payer: COMMERCIAL

## 2020-04-15 VITALS — WEIGHT: 302 LBS | BODY MASS INDEX: 42.28 KG/M2 | HEIGHT: 71 IN

## 2020-04-15 PROCEDURE — 99203 OFFICE O/P NEW LOW 30 MIN: CPT | Performed by: ORTHOPAEDIC SURGERY

## 2020-04-15 PROCEDURE — 20610 DRAIN/INJ JOINT/BURSA W/O US: CPT | Performed by: ORTHOPAEDIC SURGERY

## 2020-04-15 RX ORDER — METHYLPREDNISOLONE ACETATE 40 MG/ML
80 INJECTION, SUSPENSION INTRA-ARTICULAR; INTRALESIONAL; INTRAMUSCULAR; SOFT TISSUE ONCE
Status: COMPLETED | OUTPATIENT
Start: 2020-04-15 | End: 2020-04-15

## 2020-04-15 RX ADMIN — METHYLPREDNISOLONE ACETATE 80 MG: 40 INJECTION, SUSPENSION INTRA-ARTICULAR; INTRALESIONAL; INTRAMUSCULAR; SOFT TISSUE at 11:17

## 2020-04-15 NOTE — PROGRESS NOTES
Chief Complaint    Knee Pain (left knee )      History of Present Illness:  Gregory Todd is a 39 y.o. male. He is here today for evaluation of his left knee. He has had several weeks of left knee pain and swelling. He states this came on after he was outside doing some yard work and he started noticing some pain and swelling within the knee. The pain is primarily over the medial side of the knee. It is worse with activity. He denies radicular pain or numbness or tingling. He denies any current locking or catching within the knee. Denies any past history of left knee issues. He did go to urgent care yesterday and got an anti-inflammatory and states since using anti-inflammatory he has had a little bit of improvement of his pain. Medical History:  Patient's medications, allergies, past medical, surgical, social and family histories were reviewed and updated as appropriate. Review of Systems:  Pertinent items are noted in HPI  Review of systems reviewed from Patient History Form dated on 4/15/20 and available in the patient's chart under the Media tab. Vital Signs:  Ht 5' 11\" (1.803 m)   Wt (!) 302 lb (137 kg)   BMI 42.12 kg/m²     General Exam:   Constitutional: Patient is adequately groomed with no evidence of malnutrition  DTRs: Deep tendon reflexes are intact  Mental Status: The patient is oriented to time, place and person. The patient's mood and affect are appropriate. Knee Examination:    Inspection: There is significant visible swelling noted about the left knee today. No erythema    Palpation: Moderate palpable effusion about the left knee. He does have some tenderness along the medial joint line    Range of Motion: Extension is 5 degrees short of 0 with knee flexion today to 90 degrees    Strength: Able to do a straight leg raise    Special Tests: No instability to varus and valgus stress testing.   Negative posterior drawer    Skin: There are no rashes, ulcerations or

## 2020-04-22 ENCOUNTER — OFFICE VISIT (OUTPATIENT)
Dept: ORTHOPEDIC SURGERY | Age: 45
End: 2020-04-22
Payer: COMMERCIAL

## 2020-04-22 VITALS — HEIGHT: 71 IN | BODY MASS INDEX: 42.28 KG/M2 | WEIGHT: 302.03 LBS

## 2020-04-22 PROCEDURE — 99213 OFFICE O/P EST LOW 20 MIN: CPT | Performed by: ORTHOPAEDIC SURGERY

## 2020-04-22 NOTE — PROGRESS NOTES
Chief Complaint    Follow-up (left knee)      History of Present Illness:  Carter Moran is a 39 y.o. male. He is here today for follow-up for his left knee. He does continue to have a lot of pain focal over the medial side of his left knee as well as swelling within the left knee. He still is unable to get full extension of his knee. Has to continue to use a cane to assist in ambulation. We did drain his effusion and give him a cortisone injection at last appointment which gave him some temporary relief but did not completely take care of the pain or the swelling. Medical History:  Patient's medications, allergies, past medical, surgical, social and family histories were reviewed and updated as appropriate. Review of Systems:  Pertinent items are noted in HPI  Review of systems reviewed from Patient History Form dated on 4/22/20 and available in the patient's chart under the Media tab. Vital Signs:  Ht 5' 10.98\" (1.803 m)   Wt (!) 302 lb 0.5 oz (137 kg)   BMI 42.14 kg/m²     General Exam:   Constitutional: Patient is adequately groomed with no evidence of malnutrition  DTRs: Deep tendon reflexes are intact  Mental Status: The patient is oriented to time, place and person. The patient's mood and affect are appropriate. Knee Examination:    Inspection: Large visible effusion within the left knee. No erythema    Palpation: Moderate palpable effusion within the left knee. Tenderness primarily over the medial joint line. There is some palpable clicking over the medial joint line through range of motion    Range of Motion: Extension of the knee is about 10 degrees short of 0 now, knee flexion today was 90 degrees    Strength: He is able to do a straight leg raise    Special Tests: There is pain reproduction with both Apley Abram exam    Skin: There are no rashes, ulcerations or lesions.     Gait: Antalgic with a cane      Additional Comments:       Additional Examinations:         Right

## 2020-04-29 ENCOUNTER — OFFICE VISIT (OUTPATIENT)
Dept: ORTHOPEDIC SURGERY | Age: 45
End: 2020-04-29
Payer: COMMERCIAL

## 2020-04-29 PROCEDURE — 99213 OFFICE O/P EST LOW 20 MIN: CPT | Performed by: ORTHOPAEDIC SURGERY

## 2020-04-29 RX ORDER — ACETAMINOPHEN 500 MG
1000 TABLET ORAL EVERY 6 HOURS PRN
Qty: 60 TABLET | Refills: 3 | Status: SHIPPED | OUTPATIENT
Start: 2020-04-29

## 2020-04-29 RX ORDER — MELOXICAM 15 MG/1
15 TABLET ORAL DAILY
Qty: 30 TABLET | Refills: 3 | Status: SHIPPED | OUTPATIENT
Start: 2020-04-29 | End: 2020-05-04

## 2020-04-29 NOTE — PROGRESS NOTES
Chief Complaint    Results (MRI results left knee)      History of Present Illness:  Yamilet Mosquera is a 39 y.o. male. He is here today for follow-up for his left knee. He did have an MRI and is here today for the results. Does continue to have a lot of swelling. Does continue to have a lot of pain with limitations of mobility. Pain is primarily over the medial side of the knee. He does continue to use a cane to assist in ambulation           Medical History:  Patient's medications, allergies, past medical, surgical, social and family histories were reviewed and updated as appropriate. Review of Systems:  Pertinent items are noted in HPI  Review of systems reviewed from Patient History Form dated on 4/29/20 and available in the patient's chart under the Media tab. Vital Signs: There were no vitals taken for this visit. General Exam:   Constitutional: Patient is adequately groomed with no evidence of malnutrition  DTRs: Deep tendon reflexes are intact  Mental Status: The patient is oriented to time, place and person. The patient's mood and affect are appropriate. Knee Examination:    Inspection: Large visible effusion within the left knee. No erythema     Palpation: Moderate palpable effusion within the left knee. Tenderness primarily over the medial joint line. There is some palpable clicking over the medial joint line through range of motion     Range of Motion: Extension of the knee is about 10 degrees short of 0 now, knee flexion today was 90 degrees     Strength: He is able to do a straight leg raise     Special Tests: There is pain reproduction with both Apley Abram exam     Skin: There are no rashes, ulcerations or lesions.     Gait: Antalgic with a cane    Additional Comments:       Additional Examinations:         Right Lower Extremity: Examination of the right lower extremity does not show any tenderness, deformity or injury. Range of motion is unremarkable.   There is no gross compartment.       The ACL, PCL, medial and lateral collateral ligaments, and extensor mechanism are intact.       A large knee effusion is present.  No Baker's cyst.       Two nonaggressive appearing chondroid lesions are present within the anterior aspect of the    distal femoral metaphysis with the larger measuring up to 1.2cm in diameter (coronal PD SPIR    series 401, image 9 and axial T2 series 301, image 22) which have a narrow zone of transition,    no surrounding bone marrow edema, no cortical destruction or soft tissue mass and no aggressive    MRI features.  These have the typical appearance of small enchondromas.       Lateral patellar subluxation and tilt are present compatible with a patellar tracking    abnormality.                   CONCLUSION:   1. Radial tear of the posterior horn of the medial meniscus with mild associated medial    extrusion of the body of the medial meniscus. 2. Subchondral stress reaction within the medial-most aspect of the medial tibial plateau    without stress fracture. 3. 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 4. Patellofemoral chondromalacia with moderate- to high-grade chondromalacia involving the    patellar and to a lesser degree the trochlear articular cartilage. 5. Lateral patellar subluxation and tilt suggestive of a patellar tracking abnormality. 6. Large knee effusion. 7. No evidence of lateral meniscal tear or ligamentous tear. 8. Two small nonaggressive appearing chondroid lesions are present within the anterior aspect    of the distal femoral metaphysis with the larger measuring up to 1.2cm in diameter which have    the typical appearance of small enchondromas. Assessment : Left knee medial meniscus tear    Impression:  Encounter Diagnosis   Name Primary?     Tear of medial meniscus of left knee, current, unspecified tear type, subsequent encounter Yes

## 2020-04-30 ENCOUNTER — TELEPHONE (OUTPATIENT)
Dept: ORTHOPEDIC SURGERY | Age: 45
End: 2020-04-30

## 2020-05-04 ENCOUNTER — ANESTHESIA EVENT (OUTPATIENT)
Dept: OPERATING ROOM | Age: 45
End: 2020-05-04
Payer: COMMERCIAL

## 2020-05-04 ENCOUNTER — OFFICE VISIT (OUTPATIENT)
Dept: PRIMARY CARE CLINIC | Age: 45
End: 2020-05-04

## 2020-05-04 PROCEDURE — 99999 PR OFFICE/OUTPT VISIT,PROCEDURE ONLY: CPT | Performed by: NURSE PRACTITIONER

## 2020-05-04 NOTE — PROGRESS NOTES
Name_______________________________________Printed:____________________  Date and time of surgery___5/7/2020 @ 0815_____________________Arrival Time:__0645____masc-call__________   1. The instructions given regarding when and if a patient needs to stop oral intake prior to surgery varies. Follow the specific instructions you were given                  _x__Nothing to eat or to drink after Midnight the night before.                             ____Endoscopy patient follow your DRS instructions-generally you will be doing a part of the prep after Midnight                   ____Carbo loading or ERAS instructions will be given to select patients-if you have been given those instructions -please do the following                           The evening before your surgery after dinner before midnight drink 40 ounces of gatorade. If you are diabetic use sugar free. The morning of surgery drink 40 ounces of water. This needs to be finished 3 hours prior to your surgery start time. 2. Take the following pills with a small sip of water on the morning of surgery_____none______________________________________________                  Do not take blood pressure medications ending in pril or sartan the nichelle prior to surgery or the morning of surgery_   3. Aspirin, Ibuprofen, Advil, Naproxen, Vitamin E and other Anti-inflammatory products and supplements should be stopped for 5 -7days before surgery or as directed by your physician. 4. Check with your Doctor regarding stopping Plavix, Coumadin,Eliquis, Lovenox,Effient,Pradaxa,Xarelto, Fragmin or other blood thinners and follow their instructions. 5. Do not smoke, and do not drink any alcoholic beverages 24 hours prior to surgery. This includes NA Beer. Refrain from the usage of any recreational drugs. 6. You may brush your teeth and gargle the morning of surgery. DO NOT SWALLOW WATER   7.  You MUST make arrangements for a responsible adult to stay on site while you are here be limited to one in the room at any given time. 18.  Please bring picture ID and insurance card. 19.  Visit our web site for additional information:  [email or web address]              20.During flu season no children under the age of 15 are permitted in the hospital for the safety of all patients. 21. If you take a long acting insulin in the evening only  take half of your usual  dose the night  before your procedure              22. If you use a c-pap please bring DOS if staying overnight,             23.For your convenience [facility] has a pharmacy on site to fill your prescriptions. 24. If you use oxygen and have a portable tank please bring it  with you the DOS             25. Bring a complete list of all your medications with name and dose include any supplements. 26. Other__________________________________________   *Please call pre admission testing if you any further questions   [address]    [id number]  [phone]   [address] [place] [address]       All above information reviewed with patient in person or by phone. Patient verbalizes understanding. All questions and concerns addressed.                                                                                                  Patient/Rep______pt______________                                                                                                                                    PRE OP INSTRUCTIONS

## 2020-05-07 ENCOUNTER — HOSPITAL ENCOUNTER (OUTPATIENT)
Age: 45
Setting detail: OUTPATIENT SURGERY
Discharge: HOME | End: 2020-05-07
Attending: ORTHOPAEDIC SURGERY | Admitting: ORTHOPAEDIC SURGERY
Payer: COMMERCIAL

## 2020-05-07 ENCOUNTER — ANESTHESIA (OUTPATIENT)
Dept: OPERATING ROOM | Age: 45
End: 2020-05-07
Payer: COMMERCIAL

## 2020-05-07 VITALS
WEIGHT: 306 LBS | HEIGHT: 71 IN | BODY MASS INDEX: 42.84 KG/M2 | DIASTOLIC BLOOD PRESSURE: 98 MMHG | RESPIRATION RATE: 19 BRPM | SYSTOLIC BLOOD PRESSURE: 141 MMHG | HEART RATE: 68 BPM | TEMPERATURE: 97.4 F | OXYGEN SATURATION: 97 %

## 2020-05-07 VITALS
RESPIRATION RATE: 16 BRPM | DIASTOLIC BLOOD PRESSURE: 61 MMHG | OXYGEN SATURATION: 99 % | SYSTOLIC BLOOD PRESSURE: 107 MMHG

## 2020-05-07 LAB — SARS-COV-2: NOT DETECTED

## 2020-05-07 PROCEDURE — 6370000000 HC RX 637 (ALT 250 FOR IP): Performed by: ANESTHESIOLOGY

## 2020-05-07 PROCEDURE — 2580000003 HC RX 258: Performed by: ORTHOPAEDIC SURGERY

## 2020-05-07 PROCEDURE — 3600000004 HC SURGERY LEVEL 4 BASE: Performed by: ORTHOPAEDIC SURGERY

## 2020-05-07 PROCEDURE — 6360000002 HC RX W HCPCS: Performed by: NURSE ANESTHETIST, CERTIFIED REGISTERED

## 2020-05-07 PROCEDURE — 3700000000 HC ANESTHESIA ATTENDED CARE: Performed by: ORTHOPAEDIC SURGERY

## 2020-05-07 PROCEDURE — 7100000000 HC PACU RECOVERY - FIRST 15 MIN: Performed by: ORTHOPAEDIC SURGERY

## 2020-05-07 PROCEDURE — 6360000002 HC RX W HCPCS: Performed by: ANESTHESIOLOGY

## 2020-05-07 PROCEDURE — 2709999900 HC NON-CHARGEABLE SUPPLY: Performed by: ORTHOPAEDIC SURGERY

## 2020-05-07 PROCEDURE — 3700000001 HC ADD 15 MINUTES (ANESTHESIA): Performed by: ORTHOPAEDIC SURGERY

## 2020-05-07 PROCEDURE — 2500000003 HC RX 250 WO HCPCS: Performed by: NURSE ANESTHETIST, CERTIFIED REGISTERED

## 2020-05-07 PROCEDURE — 7100000011 HC PHASE II RECOVERY - ADDTL 15 MIN: Performed by: ORTHOPAEDIC SURGERY

## 2020-05-07 PROCEDURE — 2500000003 HC RX 250 WO HCPCS: Performed by: ORTHOPAEDIC SURGERY

## 2020-05-07 PROCEDURE — 7100000010 HC PHASE II RECOVERY - FIRST 15 MIN: Performed by: ORTHOPAEDIC SURGERY

## 2020-05-07 PROCEDURE — 6360000002 HC RX W HCPCS: Performed by: ORTHOPAEDIC SURGERY

## 2020-05-07 PROCEDURE — 7100000001 HC PACU RECOVERY - ADDTL 15 MIN: Performed by: ORTHOPAEDIC SURGERY

## 2020-05-07 PROCEDURE — 3600000014 HC SURGERY LEVEL 4 ADDTL 15MIN: Performed by: ORTHOPAEDIC SURGERY

## 2020-05-07 PROCEDURE — 2720000010 HC SURG SUPPLY STERILE: Performed by: ORTHOPAEDIC SURGERY

## 2020-05-07 RX ORDER — FENTANYL CITRATE 50 UG/ML
25 INJECTION, SOLUTION INTRAMUSCULAR; INTRAVENOUS EVERY 5 MIN PRN
Status: DISCONTINUED | OUTPATIENT
Start: 2020-05-07 | End: 2020-05-07 | Stop reason: HOSPADM

## 2020-05-07 RX ORDER — SODIUM CHLORIDE 0.9 % (FLUSH) 0.9 %
10 SYRINGE (ML) INJECTION PRN
Status: CANCELLED | OUTPATIENT
Start: 2020-05-07

## 2020-05-07 RX ORDER — HYDROCODONE BITARTRATE AND ACETAMINOPHEN 5; 325 MG/1; MG/1
1 TABLET ORAL EVERY 6 HOURS PRN
Qty: 20 TABLET | Refills: 0 | Status: SHIPPED | OUTPATIENT
Start: 2020-05-07 | End: 2020-05-14

## 2020-05-07 RX ORDER — DIPHENHYDRAMINE HYDROCHLORIDE 50 MG/ML
12.5 INJECTION INTRAMUSCULAR; INTRAVENOUS
Status: DISCONTINUED | OUTPATIENT
Start: 2020-05-07 | End: 2020-05-07 | Stop reason: HOSPADM

## 2020-05-07 RX ORDER — PROMETHAZINE HYDROCHLORIDE 25 MG/ML
6.25 INJECTION, SOLUTION INTRAMUSCULAR; INTRAVENOUS EVERY 30 MIN PRN
Status: DISCONTINUED | OUTPATIENT
Start: 2020-05-07 | End: 2020-05-07 | Stop reason: HOSPADM

## 2020-05-07 RX ORDER — SODIUM CHLORIDE 0.9 % (FLUSH) 0.9 %
10 SYRINGE (ML) INJECTION EVERY 12 HOURS SCHEDULED
Status: CANCELLED | OUTPATIENT
Start: 2020-05-07

## 2020-05-07 RX ORDER — HYDROMORPHONE HCL 110MG/55ML
0.25 PATIENT CONTROLLED ANALGESIA SYRINGE INTRAVENOUS EVERY 5 MIN PRN
Status: DISCONTINUED | OUTPATIENT
Start: 2020-05-07 | End: 2020-05-07 | Stop reason: HOSPADM

## 2020-05-07 RX ORDER — MEPERIDINE HYDROCHLORIDE 25 MG/ML
12.5 INJECTION INTRAMUSCULAR; INTRAVENOUS; SUBCUTANEOUS EVERY 5 MIN PRN
Status: DISCONTINUED | OUTPATIENT
Start: 2020-05-07 | End: 2020-05-07 | Stop reason: HOSPADM

## 2020-05-07 RX ORDER — HYDROCODONE BITARTRATE AND ACETAMINOPHEN 5; 325 MG/1; MG/1
2 TABLET ORAL PRN
Status: COMPLETED | OUTPATIENT
Start: 2020-05-07 | End: 2020-05-07

## 2020-05-07 RX ORDER — ONDANSETRON 2 MG/ML
INJECTION INTRAMUSCULAR; INTRAVENOUS PRN
Status: DISCONTINUED | OUTPATIENT
Start: 2020-05-07 | End: 2020-05-07 | Stop reason: SDUPTHER

## 2020-05-07 RX ORDER — PROPOFOL 10 MG/ML
INJECTION, EMULSION INTRAVENOUS PRN
Status: DISCONTINUED | OUTPATIENT
Start: 2020-05-07 | End: 2020-05-07 | Stop reason: SDUPTHER

## 2020-05-07 RX ORDER — FENTANYL CITRATE 50 UG/ML
50 INJECTION, SOLUTION INTRAMUSCULAR; INTRAVENOUS EVERY 5 MIN PRN
Status: DISCONTINUED | OUTPATIENT
Start: 2020-05-07 | End: 2020-05-07 | Stop reason: HOSPADM

## 2020-05-07 RX ORDER — LIDOCAINE HYDROCHLORIDE 10 MG/ML
1 INJECTION, SOLUTION EPIDURAL; INFILTRATION; INTRACAUDAL; PERINEURAL
Status: CANCELLED | OUTPATIENT
Start: 2020-05-07 | End: 2020-05-07

## 2020-05-07 RX ORDER — SODIUM CHLORIDE 9 MG/ML
INJECTION, SOLUTION INTRAVENOUS CONTINUOUS
Status: CANCELLED | OUTPATIENT
Start: 2020-05-07

## 2020-05-07 RX ORDER — FENTANYL CITRATE 50 UG/ML
INJECTION, SOLUTION INTRAMUSCULAR; INTRAVENOUS PRN
Status: DISCONTINUED | OUTPATIENT
Start: 2020-05-07 | End: 2020-05-07 | Stop reason: SDUPTHER

## 2020-05-07 RX ORDER — DEXAMETHASONE SODIUM PHOSPHATE 4 MG/ML
INJECTION, SOLUTION INTRA-ARTICULAR; INTRALESIONAL; INTRAMUSCULAR; INTRAVENOUS; SOFT TISSUE PRN
Status: DISCONTINUED | OUTPATIENT
Start: 2020-05-07 | End: 2020-05-07 | Stop reason: SDUPTHER

## 2020-05-07 RX ORDER — MIDAZOLAM HYDROCHLORIDE 1 MG/ML
INJECTION INTRAMUSCULAR; INTRAVENOUS PRN
Status: DISCONTINUED | OUTPATIENT
Start: 2020-05-07 | End: 2020-05-07 | Stop reason: SDUPTHER

## 2020-05-07 RX ORDER — HYDROCODONE BITARTRATE AND ACETAMINOPHEN 5; 325 MG/1; MG/1
1 TABLET ORAL PRN
Status: COMPLETED | OUTPATIENT
Start: 2020-05-07 | End: 2020-05-07

## 2020-05-07 RX ORDER — LIDOCAINE HYDROCHLORIDE 10 MG/ML
0.5 INJECTION, SOLUTION EPIDURAL; INFILTRATION; INTRACAUDAL; PERINEURAL ONCE
Status: DISCONTINUED | OUTPATIENT
Start: 2020-05-07 | End: 2020-05-07 | Stop reason: HOSPADM

## 2020-05-07 RX ORDER — HYDROMORPHONE HCL 110MG/55ML
0.5 PATIENT CONTROLLED ANALGESIA SYRINGE INTRAVENOUS EVERY 5 MIN PRN
Status: DISCONTINUED | OUTPATIENT
Start: 2020-05-07 | End: 2020-05-07 | Stop reason: HOSPADM

## 2020-05-07 RX ORDER — LIDOCAINE HYDROCHLORIDE 20 MG/ML
INJECTION, SOLUTION EPIDURAL; INFILTRATION; INTRACAUDAL; PERINEURAL PRN
Status: DISCONTINUED | OUTPATIENT
Start: 2020-05-07 | End: 2020-05-07 | Stop reason: SDUPTHER

## 2020-05-07 RX ORDER — SODIUM CHLORIDE, SODIUM LACTATE, POTASSIUM CHLORIDE, CALCIUM CHLORIDE 600; 310; 30; 20 MG/100ML; MG/100ML; MG/100ML; MG/100ML
INJECTION, SOLUTION INTRAVENOUS CONTINUOUS
Status: DISCONTINUED | OUTPATIENT
Start: 2020-05-07 | End: 2020-05-07 | Stop reason: HOSPADM

## 2020-05-07 RX ADMIN — FENTANYL CITRATE 50 MCG: 50 INJECTION, SOLUTION INTRAMUSCULAR; INTRAVENOUS at 08:12

## 2020-05-07 RX ADMIN — ONDANSETRON 4 MG: 2 INJECTION INTRAMUSCULAR; INTRAVENOUS at 08:36

## 2020-05-07 RX ADMIN — SODIUM CHLORIDE, POTASSIUM CHLORIDE, SODIUM LACTATE AND CALCIUM CHLORIDE: 600; 310; 30; 20 INJECTION, SOLUTION INTRAVENOUS at 07:30

## 2020-05-07 RX ADMIN — FENTANYL CITRATE 50 MCG: 50 INJECTION, SOLUTION INTRAMUSCULAR; INTRAVENOUS at 08:20

## 2020-05-07 RX ADMIN — DEXAMETHASONE SODIUM PHOSPHATE 8 MG: 4 INJECTION, SOLUTION INTRAMUSCULAR; INTRAVENOUS at 08:36

## 2020-05-07 RX ADMIN — Medication 3 G: at 08:00

## 2020-05-07 RX ADMIN — PROPOFOL 300 MG: 10 INJECTION, EMULSION INTRAVENOUS at 08:05

## 2020-05-07 RX ADMIN — HYDROCODONE BITARTRATE AND ACETAMINOPHEN 1 TABLET: 5; 325 TABLET ORAL at 09:38

## 2020-05-07 RX ADMIN — PROPOFOL 50 MG: 10 INJECTION, EMULSION INTRAVENOUS at 08:19

## 2020-05-07 RX ADMIN — HYDROMORPHONE HYDROCHLORIDE 0.5 MG: 2 INJECTION, SOLUTION INTRAMUSCULAR; INTRAVENOUS; SUBCUTANEOUS at 09:26

## 2020-05-07 RX ADMIN — HYDROMORPHONE HYDROCHLORIDE 0.5 MG: 2 INJECTION, SOLUTION INTRAMUSCULAR; INTRAVENOUS; SUBCUTANEOUS at 09:13

## 2020-05-07 RX ADMIN — FENTANYL CITRATE 50 MCG: 50 INJECTION, SOLUTION INTRAMUSCULAR; INTRAVENOUS at 08:04

## 2020-05-07 RX ADMIN — HYDROMORPHONE HYDROCHLORIDE 0.5 MG: 2 INJECTION, SOLUTION INTRAMUSCULAR; INTRAVENOUS; SUBCUTANEOUS at 09:03

## 2020-05-07 RX ADMIN — LIDOCAINE HYDROCHLORIDE 100 MG: 20 INJECTION, SOLUTION EPIDURAL; INFILTRATION; INTRACAUDAL; PERINEURAL at 08:04

## 2020-05-07 RX ADMIN — MIDAZOLAM 2 MG: 1 INJECTION INTRAMUSCULAR; INTRAVENOUS at 08:03

## 2020-05-07 ASSESSMENT — PULMONARY FUNCTION TESTS
PIF_VALUE: 3
PIF_VALUE: 0
PIF_VALUE: 23
PIF_VALUE: 10
PIF_VALUE: 10
PIF_VALUE: 15
PIF_VALUE: 1
PIF_VALUE: 23
PIF_VALUE: 15
PIF_VALUE: 19
PIF_VALUE: 4
PIF_VALUE: 16
PIF_VALUE: 4
PIF_VALUE: 23
PIF_VALUE: 23
PIF_VALUE: 14
PIF_VALUE: 21
PIF_VALUE: 0
PIF_VALUE: 17
PIF_VALUE: 17
PIF_VALUE: 4
PIF_VALUE: 16
PIF_VALUE: 9
PIF_VALUE: 21
PIF_VALUE: 21
PIF_VALUE: 4
PIF_VALUE: 14
PIF_VALUE: 19
PIF_VALUE: 0
PIF_VALUE: 15
PIF_VALUE: 9
PIF_VALUE: 1
PIF_VALUE: 1
PIF_VALUE: 22
PIF_VALUE: 22
PIF_VALUE: 0
PIF_VALUE: 22
PIF_VALUE: 17
PIF_VALUE: 23
PIF_VALUE: 4
PIF_VALUE: 21
PIF_VALUE: 12
PIF_VALUE: 21
PIF_VALUE: 14
PIF_VALUE: 22
PIF_VALUE: 18

## 2020-05-07 ASSESSMENT — PAIN DESCRIPTION - LOCATION
LOCATION: KNEE
LOCATION: KNEE

## 2020-05-07 ASSESSMENT — PAIN SCALES - GENERAL
PAINLEVEL_OUTOF10: 5
PAINLEVEL_OUTOF10: 8
PAINLEVEL_OUTOF10: 7
PAINLEVEL_OUTOF10: 8

## 2020-05-07 ASSESSMENT — PAIN DESCRIPTION - ORIENTATION
ORIENTATION: LEFT
ORIENTATION: LEFT

## 2020-05-07 ASSESSMENT — PAIN DESCRIPTION - PAIN TYPE
TYPE: SURGICAL PAIN
TYPE: SURGICAL PAIN

## 2020-05-07 ASSESSMENT — PAIN DESCRIPTION - DESCRIPTORS: DESCRIPTORS: ACHING;CONSTANT

## 2020-05-07 ASSESSMENT — PAIN - FUNCTIONAL ASSESSMENT: PAIN_FUNCTIONAL_ASSESSMENT: 0-10

## 2020-05-07 NOTE — OP NOTE
was started by the  Anesthesia Service. He was positioned supine on the operating room  table with all bony prominences padded. The left knee at that time was  injected under sterile conditions through a superior lateral approach  with a total of 60 mL of local anesthetic, which was a 1:1 mixture of  0.5% Marcaine with epinephrine and 1% lidocaine. Left knee at that time  was prepped and draped in the standard sterile fashion. Standard anterior inferior lateral portal was established at that time  by incising the skin with an #11 blade. Blunt trocar was then used to  insert a cannula into the knee joint. Spinal needle localization was  used to establish an anterior inferior medial portal.  An #11 blade was  used to incise the skin anterior inferior medially. Nerve hook was  inserted in the joint and diagnostic arthroscopy was performed with the  following findings:  1. In the patellofemoral compartment, there were some areas of grade  III to IV chondromalacia diffusely within the trochlear groove. 2.  In the medial and lateral gutters, there was no evidence of loose  bodies. 3.  In the medial compartment, there was a tear that did involve the  posterior horn of the medial meniscus which was a radial tear that was  displaced, flat posterior laterally. There was some surrounding  synovitis. There was also some grade III chondromalacia that involved  the medial femoral condyle. 4.  In the trochlear notch, the ACL and the PCL were found to be intact. 5. In the lateral compartment, the lateral meniscus and the lateral  articular surfaces were found to be intact. The medial compartment at that time was reentered. Combination of a  shaver and a biter were used to debride the medial meniscus to did get  it down to a stable rim.   We then turned the shaver towards the medial  femoral condyle and performed abrasion chondroplasty along the medial  femoral condyle along the edges to get the articular

## 2020-05-07 NOTE — ANESTHESIA PRE PROCEDURE
results found for: PROTIME, INR, APTT    HCG (If Applicable): No results found for: PREGTESTUR, PREGSERUM, HCG, HCGQUANT     ABGs: No results found for: PHART, PO2ART, GFI9PYC, FQK0TYB, BEART, X9GEHGSD     Type & Screen (If Applicable):  No results found for: [facility]    Anesthesia Evaluation  Patient summary reviewed and Nursing notes reviewed  Airway: Mallampati: II  TM distance: >3 FB   Neck ROM: full  Mouth opening: > = 3 FB Dental:          Pulmonary:                              Cardiovascular:  Exercise tolerance: good (>4 METS),   (+) hypertension: moderate,                   Neuro/Psych:               GI/Hepatic/Renal:   (+) morbid obesity          Endo/Other:                     Abdominal:           Vascular:                                        Anesthesia Plan      general     ASA 2           MIPS: Postoperative opioids intended, Prophylactic antiemetics administered and Postoperative trial extubation. Anesthetic plan and risks discussed with patient. Plan discussed with CRNA.                   Jason Palm MD   5/7/2020

## 2020-05-07 NOTE — PROGRESS NOTES
CLINICAL PHARMACY NOTE: MEDS TO [address] Select Patient?: No  Total # of Prescriptions Filled: 1   The following medications were delivered to the patient:  · Norco 5-325mg  Total # of Interventions Completed: 0  Time Spent (min): 15    Additional Documentation:    Delivered to Patient    Stephany Aguilar CPhT

## 2020-05-13 ENCOUNTER — OFFICE VISIT (OUTPATIENT)
Dept: ORTHOPEDIC SURGERY | Age: 45
End: 2020-05-13

## 2020-05-13 PROCEDURE — 99024 POSTOP FOLLOW-UP VISIT: CPT | Performed by: ORTHOPAEDIC SURGERY

## 2020-05-13 NOTE — PROGRESS NOTES
Chief Complaint    Post-Op Check (left knee)      History of Present Illness:  Nava Collado is a 39 y.o. male. He is here today for follow-up for his left knee. Status post left knee arthroscopy with partial medial meniscectomy and chondroplasty. Doing okay at this time. Does continue to have some soreness over the medial side of the knee with weightbearing. States it is much better than what he had preoperatively. Medical History:  Patient's medications, allergies, past medical, surgical, social and family histories were reviewed and updated as appropriate. Review of Systems:  Pertinent items are noted in HPI  Review of systems reviewed from Patient History Form dated on 5/13/20 and available in the patient's chart under the Media tab. Vital Signs: There were no vitals taken for this visit. General Exam:   Constitutional: Patient is adequately groomed with no evidence of malnutrition  DTRs: Deep tendon reflexes are intact  Mental Status: The patient is oriented to time, place and person. The patient's mood and affect are appropriate. Knee Examination:    Inspection: Surgical incision well-healed over the anterior aspect of the left knee. Palpation: Moderate palpable effusion today within the left knee    Range of Motion: Extension is 5 degrees short of 0 with knee flexion today to 110    Strength: Able to do a straight leg raise    Special Tests: Negative Homans sign    Skin: There are no rashes, ulcerations or lesions. Gait: Antalgic with a cane    Additional Comments:       Additional Examinations:         Right Lower Extremity: Examination of the right lower extremity does not show any tenderness, deformity or injury. Range of motion is unremarkable. There is no gross instability. There are no rashes, ulcerations or lesions.   Strength and tone are normal.      Assessment : Status post left knee arthroscopy with partial medial meniscectomy and chondroplasty    Impression:  Encounter Diagnosis   Name Primary?  Tear of medial meniscus of left knee, current, unspecified tear type, subsequent encounter Yes       Office Procedures:  No orders of the defined types were placed in this encounter. Treatment Plan: We are going to remove his sutures today. He is going to get established in the physical therapy at this point time to work range of motion and strengthening. I am going to see him back in clinic in 4 weeks for follow-up.

## 2020-06-17 ENCOUNTER — OFFICE VISIT (OUTPATIENT)
Dept: ORTHOPEDIC SURGERY | Age: 45
End: 2020-06-17

## 2020-06-17 PROCEDURE — 99024 POSTOP FOLLOW-UP VISIT: CPT | Performed by: ORTHOPAEDIC SURGERY

## 2020-08-10 ENCOUNTER — OFFICE VISIT (OUTPATIENT)
Dept: ORTHOPEDIC SURGERY | Age: 45
End: 2020-08-10
Payer: COMMERCIAL

## 2020-08-10 VITALS — TEMPERATURE: 98.4 F | WEIGHT: 306 LBS | BODY MASS INDEX: 42.84 KG/M2 | HEIGHT: 71 IN

## 2020-08-10 PROCEDURE — 20610 DRAIN/INJ JOINT/BURSA W/O US: CPT | Performed by: ORTHOPAEDIC SURGERY

## 2020-08-10 PROCEDURE — 99213 OFFICE O/P EST LOW 20 MIN: CPT | Performed by: ORTHOPAEDIC SURGERY

## 2020-08-10 RX ORDER — METHYLPREDNISOLONE ACETATE 40 MG/ML
80 INJECTION, SUSPENSION INTRA-ARTICULAR; INTRALESIONAL; INTRAMUSCULAR; SOFT TISSUE ONCE
Status: COMPLETED | OUTPATIENT
Start: 2020-08-10 | End: 2020-08-10

## 2020-08-10 RX ADMIN — METHYLPREDNISOLONE ACETATE 80 MG: 40 INJECTION, SUSPENSION INTRA-ARTICULAR; INTRALESIONAL; INTRAMUSCULAR; SOFT TISSUE at 09:27

## 2020-08-10 NOTE — PROGRESS NOTES
Chief Complaint    Follow-up (Left knee)      History of Present Illness:  Jai Samuels is a 39 y.o. male. Follow-up for his left knee. Over 3 months ago we did knee arthroscopy with partial medial meniscectomy for large displaced medial meniscus tear as well as chondroplasty of the medial femoral condyle. He did have arthritis within the medial patellofemoral compartments. Was doing well but as of recently has been having some more soreness. Soreness is all anterior medial.  Is primarily with weightbearing activities           Medical History:  Patient's medications, allergies, past medical, surgical, social and family histories were reviewed and updated as appropriate. Review of Systems:  Pertinent items are noted in HPI  Review of systems reviewed from Patient History Form dated on 8/10/20 and available in the patient's chart under the Media tab. Vital Signs:  Temp 98.4 °F (36.9 °C)   Ht 5' 11\" (1.803 m)   Wt (!) 306 lb (138.8 kg)   BMI 42.68 kg/m²     General Exam:   Constitutional: Patient is adequately groomed with no evidence of malnutrition  DTRs: Deep tendon reflexes are intact  Mental Status: The patient is oriented to time, place and person. The patient's mood and affect are appropriate. Knee Examination:    Inspection: Surgical incisions well-healed     Palpation: Mild palpable effusion about the left knee. There is some tenderness along the anterior medial joint line     Range of Motion: 0 to 120 degrees     Strength: Able to do straight leg raise     Special Tests: No instability to varus valgus stress. Negative Homans sign     Skin: There are no rashes, ulcerations or lesions.     Gait: Normal    Additional Comments:       Additional Examinations:         Right Lower Extremity: Examination of the right lower extremity does not show any tenderness, deformity or injury. Range of motion is unremarkable. There is no gross instability. There are no rashes, ulcerations or lesions. Strength and tone are normal.      Assessment : Left knee osteoarthritis    Impression:  Encounter Diagnosis   Name Primary?  Primary osteoarthritis of left knee Yes       Office Procedures:  No orders of the defined types were placed in this encounter. Treatment Plan: Discussed the diagnosis and treatment options with him today. He does seem to have pain is primarily related arthritis in the knee at this point in time. Recommend cortisone injection today. Also going to get him approved for series of lubrication injections into the knee. We will see him back once he is approved for Visco supplementation. Also did suggest use of anti-inflammatories as needed    Under sterile conditions left knee was injected through superior lateral approach with 2 cc of 40 mg Depo-Medrol mixed with 3 cc of quarter percent Marcaine. He did tolerate the injection well.   Postinjection precautions were given

## 2020-08-17 ENCOUNTER — TELEPHONE (OUTPATIENT)
Dept: ORTHOPEDIC SURGERY | Age: 45
End: 2020-08-17

## 2020-08-17 NOTE — TELEPHONE ENCOUNTER
8/17/2020. SHEREE MACK  () series of 1. APPROVED # SEE FAX.  8/14/2020-8/14/2021. LEFT KNEE.  VALID & BILLABLE ON CLAIM YES. [facility]. Per FAX from [facility].

## 2020-08-19 ENCOUNTER — TELEPHONE (OUTPATIENT)
Dept: ORTHOPEDIC SURGERY | Age: 45
End: 2020-08-19

## 2020-08-24 NOTE — TELEPHONE ENCOUNTER
8/24/2020  SUPARTZ  (SERIES OF 3)  LEFT  KNEE. APPROVED  SPECIALTY PHARMACY COVERAGE. APPROVED # [id number]. DATES:  08/14/2020 - 08/14/2021  PER FAX FROM [facility]. PHARMACY HAS MADE SEVERAL ATTEMPTS TO CONTACT PATIENT @ [phone] WITH NO SUCCESS. PATIENT CAN CONTACT THEM TO GIVE CONSENT TO DELIVER TO [facility]. P: [phone]. NOTE: Original request was for non-preferred drug MONOVISC . Okay to switch to preferred drug SUPARTZ FX,  Per Madison Garcia. AP      NOT A COVERED MEDICAL BENEFIT FOR THIS SELF FUNDED BCBS OF [employer]O Khan REF # [id number] .  AP

## 2020-09-03 NOTE — TELEPHONE ENCOUNTER
09/03/2020 CALLED FOR DRUG STATUS. PER ERIKA, [facility] HAS CANCELLED THE NEW PATIENT ENROLLMENT AS THEY HAVE BEEN UNABLE TO REACH PATIENT. THEY WILL NO LONGER ATTEMPT TO CONTACT PATIENT. PATIENT CAN CALL THEM [phone]. AP

## 2021-01-08 ENCOUNTER — OFFICE VISIT (OUTPATIENT)
Dept: ORTHOPEDIC SURGERY | Age: 46
End: 2021-01-08
Payer: COMMERCIAL

## 2021-01-08 VITALS — HEIGHT: 71 IN | WEIGHT: 306 LBS | BODY MASS INDEX: 42.84 KG/M2 | TEMPERATURE: 98.5 F

## 2021-01-08 DIAGNOSIS — M17.12 PRIMARY OSTEOARTHRITIS OF LEFT KNEE: ICD-10-CM

## 2021-01-08 DIAGNOSIS — M25.511 RIGHT SHOULDER PAIN, UNSPECIFIED CHRONICITY: Primary | ICD-10-CM

## 2021-01-08 DIAGNOSIS — S46.011A TRAUMATIC TEAR OF RIGHT ROTATOR CUFF, UNSPECIFIED TEAR EXTENT, INITIAL ENCOUNTER: ICD-10-CM

## 2021-01-08 PROCEDURE — 99214 OFFICE O/P EST MOD 30 MIN: CPT | Performed by: ORTHOPAEDIC SURGERY

## 2021-01-08 PROCEDURE — 20610 DRAIN/INJ JOINT/BURSA W/O US: CPT | Performed by: ORTHOPAEDIC SURGERY

## 2021-01-08 RX ORDER — METHYLPREDNISOLONE ACETATE 40 MG/ML
80 INJECTION, SUSPENSION INTRA-ARTICULAR; INTRALESIONAL; INTRAMUSCULAR; SOFT TISSUE ONCE
Status: COMPLETED | OUTPATIENT
Start: 2021-01-08 | End: 2021-01-08

## 2021-01-08 RX ADMIN — METHYLPREDNISOLONE ACETATE 80 MG: 40 INJECTION, SUSPENSION INTRA-ARTICULAR; INTRALESIONAL; INTRAMUSCULAR; SOFT TISSUE at 09:19

## 2021-01-08 NOTE — PROGRESS NOTES
Chief Complaint    Shoulder Pain (right shoulder )      History of Present Illness:  Elizabeth Mcfarland is a 39 y.o. male. He is here today for evaluation of his right shoulder as well as for follow-up for his left knee. He injured his right shoulder back in December of this year while he was trying to place a weighted blanket over his wife. He states when he tried to pull the blanket over her he felt something sharp and painful directly over the lateral aspect of the shoulder. He has had persistent pain within that shoulder with any type of movement away from his body ever since then. He is also noticed a lot of weakness within the shoulder. He has pain that does keep him awake at nighttime. 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 He has never had pain similar like what he has right now though. He does have a history of left knee osteoarthritis that we have treated with cortisone injections in the past and he would like to get a cortisone injection into the knee today       Medical History:  Patient's medications, allergies, past medical, surgical, social and family histories were reviewed and updated as appropriate. Review of Systems:  Pertinent items are noted in HPI  Review of systems reviewed from Patient History Form dated on 1/8/21 and available in the patient's chart under the Media tab. Vital Signs:  Temp 98.5 °F (36.9 °C)   Ht 5' 11\" (1.803 m)   Wt (!) 306 lb (138.8 kg)   BMI 42.68 kg/m²     General Exam:   Constitutional: Patient is adequately groomed with no evidence of malnutrition  DTRs: Deep tendon reflexes are intact  Mental Status: The patient is oriented to time, place and person. The patient's mood and affect are appropriate. Lymphatic: The lymphatic examination bilaterally reveals all areas to be without enlargement or induration. Shoulder Examination:    Inspection: No significant swelling erythema noted at the right shoulder today    Palpation: There is some tenderness palpation over the bicipital groove. No [facility]    Range of Motion: Active forward elevation is 160 degrees. External rotation is 70 degrees    Strength: There is weakness noted with isolated supraspinatus testing as well as resisted external rotation    Special Tests: Positive drop arm exam.  Pain weakness both with [place]'s active depression testing as well as speeds testing. Positive Neer and [place]ah impingement testing    Skin: There are no rashes, ulcerations or lesions. Gait: Normal      Additional Comments:       Additional Examinations:         Left Upper Extremity: Examination of the left upper extremity does not show any tenderness, deformity or injury. Range of motion is unremarkable. There is no gross instability. There are no rashes, ulcerations or lesions. Strength and tone are normal.  Thoracic Spine: Examination of the thoracic spine does not show any tenderness, deformity or injury. Range of motion is unremarkable. There is no gross instability. There are no rashes, ulcerations or lesions. Strength and tone are normal.  Neck: Examination of the neck does not show any tenderness, deformity or injury. Range of motion is unremarkable. There is no gross instability. There are no rashes, ulcerations or lesions. Strength and tone are normal.    Radiology:     X-rays obtained and reviewed in office:  Views 4 views of the right shoulder demonstrates no obvious fracture dislocation or other osseous abnormalities. He does demonstrate AC joint arthropathy with joint space loss and osteophyte formation      Assessment : Right shoulder concern for rotator cuff tear, left knee osteoarthritis    Impression:  Encounter Diagnoses   Name Primary?     Right shoulder pain, unspecified chronicity Yes  Primary osteoarthritis of left knee        Office Procedures:  Orders Placed This Encounter   Procedures    XR SHOULDER RIGHT (MIN 2 VIEWS)     Standing Status:   Future     Number of Occurrences:   1     Standing Expiration Date:   1/8/2022    59396 - SD DRAIN/INJECT LARGE JOINT/BURSA       Treatment Plan: I would recommend at this time MRI of the right shoulder to rule out rotator cuff tear based on clinical exam with significant weakness and a positive drop arm exam.  For the left knee will proceed with a cortisone injection today. He has the option of these injections every 3 to 4 months as needed    Under sterile conditions left knee was injected through an anterolateral approach with 2 cc of 40 mg Depo-Medrol mixed with 3 cc of quarter percent Marcaine. He did tolerate the injection well.   Postinjection precautions were given

## 2023-09-25 ENCOUNTER — OFFICE VISIT (OUTPATIENT)
Dept: ORTHOPEDIC SURGERY | Age: 48
End: 2023-09-25
Payer: COMMERCIAL

## 2023-09-25 VITALS — WEIGHT: 287 LBS | HEIGHT: 71 IN | BODY MASS INDEX: 40.18 KG/M2

## 2023-09-25 DIAGNOSIS — M25.561 ACUTE PAIN OF RIGHT KNEE: Primary | ICD-10-CM

## 2023-09-25 DIAGNOSIS — S83.241A TEAR OF MEDIAL MENISCUS OF RIGHT KNEE, UNSPECIFIED TEAR TYPE, UNSPECIFIED WHETHER OLD OR CURRENT TEAR, INITIAL ENCOUNTER: ICD-10-CM

## 2023-09-25 PROCEDURE — 99204 OFFICE O/P NEW MOD 45 MIN: CPT | Performed by: INTERNAL MEDICINE

## 2023-09-26 NOTE — PROGRESS NOTES
Chief Complaint:   Chief Complaint   Patient presents with    Knee Pain     Right knee pain- pain for last 4-5 weeks, having sharp ache under knee cap on medial side when applying pressure on foot, continues with throbbing pain when at rest. Was seen at [facility] on 9/18/23 was given a shot           History of Present Illness:       Patient is a 50 y.o. male returns follow up for the above complaint. The symptoms began 4 weeks ago unprompted by any specific injury or event and have gradually worsened over time prompting evaluation at an outside orthopedic clinic. Past orthopedic history significant for partial meniscectomy of left knee    Pain localizes to the medial side of the knee and  does not seems to follow a typical patella femoral provacative pattern. There are not mechanical symptoms that are active. The patient admits to subjective instability about the knee and admits to new onset weakness of the lower extremity. Pain level 5    The patient denies a pattern of activity related swelling. Treatment to date: Cortisone injection 9/19/2023 at outside institution with no improvement  Bracing: collateral ligament brace with mild improvement. There is no prior history of knee trauma. There is no prior history of autoimmune disease, inflammatory arthropathy, or crystal arthropathy. Past Medical History:        Past Medical History:   Diagnosis Date    Back pain     Hypertension     Kidney stone         Present Medications:         Current Outpatient Medications   Medication Sig Dispense Refill    acetaminophen (APAP EXTRA STRENGTH) 500 MG tablet Take 2 tablets by mouth every 6 hours as needed for Pain 60 tablet 3    lisinopril (PRINIVIL;ZESTRIL) 5 MG tablet Take 1 tablet by mouth daily (Patient taking differently: Take 10 mg by mouth daily ) 30 tablet 3     No current facility-administered medications for this visit.          Allergies:      No Known Allergies        Review of

## 2023-10-09 ENCOUNTER — OFFICE VISIT (OUTPATIENT)
Dept: ORTHOPEDIC SURGERY | Age: 48
End: 2023-10-09

## 2023-10-09 VITALS — WEIGHT: 287 LBS | HEIGHT: 71 IN | BODY MASS INDEX: 40.18 KG/M2

## 2023-10-09 DIAGNOSIS — M84.361A STRESS FRACTURE OF RIGHT TIBIA, INITIAL ENCOUNTER: ICD-10-CM

## 2023-10-09 DIAGNOSIS — S83.241A TEAR OF MEDIAL MENISCUS OF RIGHT KNEE, UNSPECIFIED TEAR TYPE, UNSPECIFIED WHETHER OLD OR CURRENT TEAR, INITIAL ENCOUNTER: ICD-10-CM

## 2023-10-11 NOTE — PROGRESS NOTES
Chief Complaint:   Chief Complaint   Patient presents with    Follow-up     Right knee MRI results- feeling about the same, brace helps with stability and still doing HEP          History of Present Illness:       Patient is a 50 y.o. male returns follow up for the above complaint. The patient was last seen approximately 2 weeksago. The symptoms show no change since the last visit. The patient has had further testing for the problem. MRI completed in the interim    Pain localizes to the medial compartment region of the knee no mechanical symptoms    He continues with functional knee bracing    No pattern of active related swelling of significance pain levels 5/10 predictably worsened with weightbearing       Past Medical History:        Past Medical History:   Diagnosis Date    Back pain     Hypertension     Kidney stone         Present Medications:         Current Outpatient Medications   Medication Sig Dispense Refill    acetaminophen (APAP EXTRA STRENGTH) 500 MG tablet Take 2 tablets by mouth every 6 hours as needed for Pain 60 tablet 3    lisinopril (PRINIVIL;ZESTRIL) 5 MG tablet Take 1 tablet by mouth daily (Patient taking differently: Take 10 mg by mouth daily ) 30 tablet 3     No current facility-administered medications for this visit. Allergies:      No Known Allergies        Review of Systems:    Pertinent items are noted in HPI        Vital Signs: There were no vitals filed for this visit.      General Exam:     Constitutional: Patient is adequately groomed with no evidence of malnutrition    Physical Exam: left knee      Primary Exam:    Inspection: No appreciable effusion no deformity there is tender      Palpation: Tenderness over the proximal tibia greater than the medial joint line      Range of Motion: Stable unchanged from previous      Strength: Normal with gravity active knee extension      Special Tests: Steinmann's test equivocal      Skin: There are no rashes, ulcerations or

## 2023-10-17 DIAGNOSIS — S83.241A TEAR OF MEDIAL MENISCUS OF RIGHT KNEE, UNSPECIFIED TEAR TYPE, UNSPECIFIED WHETHER OLD OR CURRENT TEAR, INITIAL ENCOUNTER: Primary | ICD-10-CM

## 2023-10-17 DIAGNOSIS — M84.361A STRESS FRACTURE OF RIGHT TIBIA, INITIAL ENCOUNTER: ICD-10-CM

## 2023-10-19 ENCOUNTER — OFFICE VISIT (OUTPATIENT)
Dept: ORTHOPEDIC SURGERY | Age: 48
End: 2023-10-19
Payer: COMMERCIAL

## 2023-10-19 VITALS — BODY MASS INDEX: 40.18 KG/M2 | WEIGHT: 287 LBS | HEIGHT: 71 IN

## 2023-10-19 DIAGNOSIS — M17.11 PRIMARY OSTEOARTHRITIS OF RIGHT KNEE: ICD-10-CM

## 2023-10-19 DIAGNOSIS — M25.561 RIGHT KNEE PAIN, UNSPECIFIED CHRONICITY: Primary | ICD-10-CM

## 2023-10-19 PROCEDURE — 99205 OFFICE O/P NEW HI 60 MIN: CPT | Performed by: ORTHOPAEDIC SURGERY

## 2023-10-19 NOTE — PROGRESS NOTES
bear weight right away. The risks of this were defined as but not limited to infection, bleeding, damage to blood vessels or nerves. Need for additional surgery. He does understand elects proceed. Electronically signed by David Balbuena PA-C on 10/19/23     Disclaimer: This note was dictated with voice recognition software. Though review and correction are routine, we apologize for any errors. I have seen and examined the patient and agree with the above assessment and plan. Greater than 60 minutes were spent with this encounter. Time spent included evaluating the patient's chart prior to arrival.  Evaluating the patient in the office including history, physical examination, imaging reviewing, and counseling on next steps. Lastly, time was spent discussing orders with my staff as well as providing documentation in the chart.

## 2023-10-31 ENCOUNTER — TELEPHONE (OUTPATIENT)
Dept: ORTHOPEDIC SURGERY | Age: 48
End: 2023-10-31

## 2023-10-31 NOTE — TELEPHONE ENCOUNTER
DOS CHANGE TO 12/1/23.  PATIENT AWARE & will  packet at [address] [facility] office

## 2023-11-07 NOTE — TELEPHONE ENCOUNTER
CPT: 30897  AUTH: [id number]  DATE RANGE: 11/17/23 TO 1/15/24  INSURANCE: BCBS  LOCATION [facility]  AREA OF SX RT KNEE  NOTE: DOC SCANNED

## 2023-11-21 ENCOUNTER — TELEPHONE (OUTPATIENT)
Dept: ORTHOPEDIC SURGERY | Age: 48
End: 2023-11-21

## 2023-11-21 NOTE — TELEPHONE ENCOUNTER
Patient called to cancel sx on 12/1/23. SX cancelled. He wants to reschedule after wife is better. He requested appt to discuss pain/swelling until he is able to have sx.

## 2024-06-05 NOTE — TELEPHONE ENCOUNTER
Pt's wife called states pt was seen 4/17 and received a letter to go back to work tomorrow but he is still not feeling 100% better and questioning if he could get another letter to be off tomorrow.         If so please call and she will come in and  oral

## (undated) DEVICE — WEREWOLF FLOW 50 COBLATION WAND: Brand: COBLATION

## (undated) DEVICE — Device

## (undated) DEVICE — SYRINGE, LUER LOCK, 60ML: Brand: MEDLINE

## (undated) DEVICE — APPLICATOR PREP 26ML 0.7% IOD POVACRYLEX 74% ISO ALC ST

## (undated) DEVICE — GAUZE,SPONGE,4"X4",8PLY,STRL,LF,10/TRAY: Brand: MEDLINE

## (undated) DEVICE — 3.5 MM FULL RADIUS ELITE STRAIGHT                                    DISPOSABLE BLADES, BEIGE,PACKAGED 6                                    PER BOX, STERILE

## (undated) DEVICE — DYONICS 25 PATIENT TUBE SET MUST                                    BE USED WITH 7211007, 12 PER BOX

## (undated) DEVICE — GLOVE ORANGE PI 8   MSG9080

## (undated) DEVICE — HYPODERMIC SAFETY NEEDLE: Brand: MAGELLAN

## (undated) DEVICE — SUTURE ETHLN SZ 4-0 L18IN NONABSORBABLE BLK L19MM PS-2 3/8 1667H

## (undated) DEVICE — MASC TURNOVER KIT: Brand: MEDLINE INDUSTRIES, INC.

## (undated) DEVICE — BANDAGE ADH W1XL3IN NAT FAB WVN FLX DURABLE N ADH PD SEAL

## (undated) DEVICE — SHEET,DRAPE,53X77,STERILE: Brand: MEDLINE